# Patient Record
Sex: FEMALE | Race: WHITE | Employment: OTHER | URBAN - METROPOLITAN AREA
[De-identification: names, ages, dates, MRNs, and addresses within clinical notes are randomized per-mention and may not be internally consistent; named-entity substitution may affect disease eponyms.]

---

## 2019-10-18 ENCOUNTER — HOSPITAL ENCOUNTER (OUTPATIENT)
Age: 44
Discharge: HOME | End: 2019-10-20
Payer: COMMERCIAL

## 2019-10-18 LAB
T4 FREE: 1.38 NG/DL (ref 0.93–1.7)
TSH SERPL DL<=0.05 MIU/L-ACNC: 1.49 UIU/ML (ref 0.27–4.2)

## 2019-10-18 PROCEDURE — 84439 ASSAY OF FREE THYROXINE: CPT

## 2019-10-18 PROCEDURE — 84443 ASSAY THYROID STIM HORMONE: CPT

## 2019-10-18 PROCEDURE — 36415 COLL VENOUS BLD VENIPUNCTURE: CPT

## 2020-02-17 ENCOUNTER — TELEPHONE (OUTPATIENT)
Dept: BREAST CENTER | Age: 45
End: 2020-02-17

## 2020-02-18 NOTE — PROGRESS NOTES
Subjective:  History of Left breast DCIS s/p Bilateral skin sparing mastectomy with autologous reconstruction and left SLN biopsy at [facility] 07/07/2014 per Dr. Rosemary Lockhart/Dr. Jesusita Sparrow. Patient ID: Dusty Medley is a 45 y.o. female. MIRYAM Hoffman is an extremely pleasant 45 y.o. female with a history of pre-menopausal (age 41-39) stage 0 (Tis, N0, M0) left breast ductal carcinoma in situ: ER/TN positive with H-score 190; TN positive H-score 95. She presents to establish care locally. She underwent a baseline mammogram in May 2014 which revealed calcifications in the upper outer quadrant of the left breast over a 6 x 1.5 cm area which were deemed suspicious. A left breast ultrasound noted calcifications without mass.     -05/28/2014 Stereotactic core needle biopsy of calcifications in the retroareolar breast as well as in the posterior 1:00 breast revealed ductal carcinoma in situ, solid and cribriform type, nuclear grade 2-3 with comedo necrosis and focal apocrine features. No evidence of invasive carcinoma.    -06/10/2014:  Diagnostic mammogram at [facility] noted calcifications over a 9.7 cm region extending to within 3 mm of the nipple into the upper outer quadrant. 06/18/2014 Bilateral Breast MRI @ [facility]  FINDINGS:  Examination shows no linear, nodular or spiculated areas of   enhancement within the right breast. There is benign fibroglandular   enhancement seen in the right breast    Examination shows  nodular enhancement spanning from the posterior   extent of the nipple up towards the 1:00 region, measuring 97 mm   anterior to posterior by approximately 50 mm superior to inferior by   approximately 30 mm medial to lateral. There are biopsy site changes   seen in the retroareolar region and at the 1:00 location which   essentially marked the most anterior extent of the calcifications and   the near posterior extent of the calcifications.  There are still   residual hematomas seen at both sites larger at the posterior aspect   measuring approximately 2 cm in longest dimension with the anterior   hematoma measuring 15 mm in longest dimension.  The lymph nodes are   symmetrical in their appearance bilaterally. Correlation is made with   the patients prior study.     -Status post bilateral skin sparing mastectomy with autologous reconstruction and left sentinel lymph node biopsy at [facility] in [place] on 2014 per Dr. Jackeline Roth and Dr. Alma Delia Humphrey. Breast cancer risk factors include paternal great aunt with breast cancer at age 48; 1 (of this aunts daughters) paternal great cousins with breast cancer in their 46s. Hx skin cancer in the family, unknown which sub-type. Ashkenazi Hindu Ancestry: No.    OBSTETRIC RELATED HISTORY:  Age of menarche was 8. Currently on IUD Mirena since 2009. Patient denies hormonal therapy; an exception may be short term treatment she had for acne in the past.  Patient is . Age of first live birth was 34. Patient did breast feed. CANCER SURVEILLANCE HISTORY:  Mammograms: Yes   Breast MRI's: Yes   Breast Biopsies: Yes   Colonoscopy: Yes   GI Polyps: No   EGD: No   Pelvic Exam: Yes/2019  Pap Smear: Yes/2019  Dermatology: Yes 2020; Rosacea   Lung screening: no    BMI 40.7  Bra Size: 42 DDD    Because violence is so common, we ask all our patients: are you in a relationship or do you live with a person who threatens, hurts, or controls you:  Safe. Patient drinks little caffeinated beverages. Patient does not smoke cigarettes. Patient does not use recreational drugs.       Past Medical History:   Diagnosis Date    Cancer [facility])     left breast -Bilateral mastectomy with tram flap reconstruction      Past Medical History:   Diagnosis Date    Abnormal glandular Papanicolaou smear of cervix     Anemia, unspecified     Cancer ([facility] [address] 2014    left breast facility-administered medications on file prior to visit. 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 Generally feels well. Busy with work (Chiropractic practice) and sports with her children. HENT: Negative for congestion, postnasal drip, rhinorrhea, sinus pressure, sinus pain, sore throat, trouble swallowing and voice change. Eyes: Negative for discharge, itching and visual disturbance. Respiratory: Negative for cough, choking, chest tightness, shortness of breath and wheezing. Cardiovascular: Negative for chest pain, palpitations and leg swelling. Gastrointestinal: Negative for abdominal distention, abdominal pain, blood in stool, constipation, diarrhea, nausea and vomiting. Endocrine: Negative for cold intolerance and heat intolerance. Genitourinary: Negative for difficulty urinating, dysuria, frequency and hematuria. Musculoskeletal: Negative for arthralgias, back pain, gait problem, joint swelling, myalgias, neck pain and neck stiffness. Allergic/Immunologic: Negative for environmental allergies and food allergies. Neurological: Negative for dizziness, seizures, syncope, speech difficulty, weakness, light-headedness and headaches. Hematological: Negative for adenopathy. Does not bruise/bleed easily. Psychiatric/Behavioral: Negative for agitation, confusion and decreased concentration. The patient is not nervous/anxious. Objective:   Physical Exam  Vitals signs and nursing note reviewed. Constitutional:       General: She is not in acute distress. Appearance: Normal appearance. She is well-developed. She is not diaphoretic. Comments: ECOG 0. Pleasant and cooperative. HENT:      Head: Normocephalic and atraumatic. Mouth/Throat:      Pharynx: No oropharyngeal exudate.    Eyes:      General: No scleral icterus. Right eye: No discharge. Left eye: No discharge. Conjunctiva/sclera: Conjunctivae normal.   Neck:      Musculoskeletal: Normal range of motion and neck supple. Thyroid: No thyromegaly. Vascular: No JVD. Trachea: No tracheal deviation. Cardiovascular:      Rate and Rhythm: Normal rate and regular rhythm. Heart sounds: No murmur. No friction rub. No gallop. Pulmonary:      Effort: Pulmonary effort is normal. No respiratory distress or retractions. Breath sounds: Normal breath sounds. No stridor. No wheezing or rales. Chest:      Chest wall: No mass, lacerations, deformity, swelling, tenderness or edema. Breasts: Breasts are symmetrical.         Right: No inverted nipple, mass, nipple discharge, skin change or tenderness. Left: No inverted nipple, mass, nipple discharge, skin change or tenderness. Comments: Bilateral reconstructed breasts. Good symmetry and without clinically suspicious findings. No axillary adenopathy. Bilateral tattooing of NAC (reports tattooing was in [place]). No edema of the bilateral UE. Abdominal:      General: There is no distension. Palpations: Abdomen is soft. Tenderness: There is no abdominal tenderness. There is no guarding or rebound. Musculoskeletal: Normal range of motion. General: No tenderness or deformity. Right shoulder: Normal.      Left shoulder: Normal.   Lymphadenopathy:      Cervical: No cervical adenopathy. Right cervical: No superficial, deep or posterior cervical adenopathy. Left cervical: No superficial, deep or posterior cervical adenopathy. Upper Body:      Right upper body: No pectoral adenopathy. Left upper body: No pectoral adenopathy. Skin:     General: Skin is warm and dry. Coloration: Skin is not pale. Findings: No erythema or rash.    Neurological:      Mental Status: She is alert and oriented to person, place, Kelly Hampton, APRN - CNP

## 2020-02-20 ENCOUNTER — TELEPHONE (OUTPATIENT)
Dept: BREAST CENTER | Age: 45
End: 2020-02-20

## 2020-02-20 ENCOUNTER — HOSPITAL ENCOUNTER (OUTPATIENT)
Age: 45
Discharge: HOME | End: 2020-02-22
Payer: COMMERCIAL

## 2020-02-20 ENCOUNTER — OFFICE VISIT (OUTPATIENT)
Dept: BREAST CENTER | Age: 45
End: 2020-02-20
Payer: COMMERCIAL

## 2020-02-20 VITALS
HEIGHT: 65 IN | DIASTOLIC BLOOD PRESSURE: 60 MMHG | OXYGEN SATURATION: 98 % | TEMPERATURE: 98.2 F | HEART RATE: 70 BPM | WEIGHT: 244 LBS | RESPIRATION RATE: 18 BRPM | SYSTOLIC BLOOD PRESSURE: 102 MMHG | BODY MASS INDEX: 40.65 KG/M2

## 2020-02-20 LAB
ALBUMIN SERPL-MCNC: 4.3 G/DL (ref 3.5–5.2)
ALP BLD-CCNC: 78 U/L (ref 35–104)
ALT SERPL-CCNC: 23 U/L (ref 0–32)
ANION GAP SERPL CALCULATED.3IONS-SCNC: 14 MMOL/L (ref 7–16)
AST SERPL-CCNC: 17 U/L (ref 0–31)
BASOPHILS ABSOLUTE: 0.08 E9/L (ref 0–0.2)
BASOPHILS RELATIVE PERCENT: 1 % (ref 0–2)
BILIRUB SERPL-MCNC: 0.2 MG/DL (ref 0–1.2)
BUN BLDV-MCNC: 16 MG/DL (ref 6–20)
CALCIUM SERPL-MCNC: 9.6 MG/DL (ref 8.6–10.2)
CHLORIDE BLD-SCNC: 103 MMOL/L (ref 98–107)
CO2: 21 MMOL/L (ref 22–29)
CREAT SERPL-MCNC: 0.8 MG/DL (ref 0.5–1)
EOSINOPHILS ABSOLUTE: 0.16 E9/L (ref 0.05–0.5)
EOSINOPHILS RELATIVE PERCENT: 1.9 % (ref 0–6)
GFR AFRICAN AMERICAN: >60
GFR NON-AFRICAN AMERICAN: >60 ML/MIN/1.73
GLUCOSE BLD-MCNC: 108 MG/DL (ref 74–99)
HCT VFR BLD CALC: 45 % (ref 34–48)
HEMOGLOBIN: 14.6 G/DL (ref 11.5–15.5)
IMMATURE GRANULOCYTES #: 0.02 E9/L
IMMATURE GRANULOCYTES %: 0.2 % (ref 0–5)
LYMPHOCYTES ABSOLUTE: 2.17 E9/L (ref 1.5–4)
LYMPHOCYTES RELATIVE PERCENT: 26.1 % (ref 20–42)
MCH RBC QN AUTO: 29 PG (ref 26–35)
MCHC RBC AUTO-ENTMCNC: 32.4 % (ref 32–34.5)
MCV RBC AUTO: 89.3 FL (ref 80–99.9)
MONOCYTES ABSOLUTE: 0.52 E9/L (ref 0.1–0.95)
MONOCYTES RELATIVE PERCENT: 6.3 % (ref 2–12)
NEUTROPHILS ABSOLUTE: 5.37 E9/L (ref 1.8–7.3)
NEUTROPHILS RELATIVE PERCENT: 64.5 % (ref 43–80)
PDW BLD-RTO: 13.2 FL (ref 11.5–15)
PLATELET # BLD: 262 E9/L (ref 130–450)
PMV BLD AUTO: 10.6 FL (ref 7–12)
POTASSIUM SERPL-SCNC: 4.3 MMOL/L (ref 3.5–5)
RBC # BLD: 5.04 E12/L (ref 3.5–5.5)
SODIUM BLD-SCNC: 138 MMOL/L (ref 132–146)
T4 FREE: 1.07 NG/DL (ref 0.93–1.7)
TOTAL PROTEIN: 7.3 G/DL (ref 6.4–8.3)
TSH SERPL DL<=0.05 MIU/L-ACNC: 11.15 UIU/ML (ref 0.27–4.2)
WBC # BLD: 8.3 E9/L (ref 4.5–11.5)

## 2020-02-20 PROCEDURE — 84443 ASSAY THYROID STIM HORMONE: CPT

## 2020-02-20 PROCEDURE — 99204 OFFICE O/P NEW MOD 45 MIN: CPT | Performed by: NURSE PRACTITIONER

## 2020-02-20 PROCEDURE — 80053 COMPREHEN METABOLIC PANEL: CPT

## 2020-02-20 PROCEDURE — 99203 OFFICE O/P NEW LOW 30 MIN: CPT | Performed by: NURSE PRACTITIONER

## 2020-02-20 PROCEDURE — 84439 ASSAY OF FREE THYROXINE: CPT

## 2020-02-20 PROCEDURE — 36415 COLL VENOUS BLD VENIPUNCTURE: CPT

## 2020-02-20 PROCEDURE — 85025 COMPLETE CBC W/AUTO DIFF WBC: CPT

## 2020-02-20 PROCEDURE — 36415 COLL VENOUS BLD VENIPUNCTURE: CPT | Performed by: NURSE PRACTITIONER

## 2020-02-20 RX ORDER — LEVOTHYROXINE SODIUM 0.15 MG/1
150 TABLET ORAL DAILY
COMMUNITY
End: 2022-06-07

## 2020-02-20 SDOH — HEALTH STABILITY: MENTAL HEALTH: HOW OFTEN DO YOU HAVE A DRINK CONTAINING ALCOHOL?: 2-4 TIMES A MONTH

## 2020-02-20 ASSESSMENT — ENCOUNTER SYMPTOMS
SHORTNESS OF BREATH: 0
CONSTIPATION: 0
EYE DISCHARGE: 0
VOMITING: 0
COUGH: 0
BLOOD IN STOOL: 0
DIARRHEA: 0
WHEEZING: 0
SINUS PAIN: 0
RHINORRHEA: 0
TROUBLE SWALLOWING: 0
NAUSEA: 0
SORE THROAT: 0
ABDOMINAL PAIN: 0
VOICE CHANGE: 0
BACK PAIN: 0
CHEST TIGHTNESS: 0
SINUS PRESSURE: 0
EYE ITCHING: 0
CHOKING: 0
ABDOMINAL DISTENTION: 0

## 2020-02-20 NOTE — COMMUNICATION BODY
Assessment:  45 y.o. extremely pleasant female with history of screening detected stage 0 (Tis, N0, M0,) carcinoma in situ extensively involving the left breast, (measuring over a 9.7 cm region) diagnosed in 2016 at age 42. She has no first degree relatives with breast or gynecologic malignancies. She presents to [facility] care locally.    -05/28/2014 Stereotactic core needle biopsy of calcifications in the retroareolar breast as well as in the posterior 1:00 breast revealed ductal carcinoma in situ, solid and cribriform type, nuclear grade 2-3 with comedo necrosis and focal apocrine features. No evidence of invasive carcinoma.    -06/10/2014:  Diagnostic mammogram at [facility] noted calcifications over a 9.7 cm region extending to within 3 mm of the nipple into the upper outer quadrant.     -06/18/2014 Bilateral Breast MRI @ [address]:  Examination shows no linear, nodular or spiculated areas of   enhancement within the right breast. There is benign fibroglandular   enhancement seen in the right breast    Examination shows  nodular enhancement spanning from the posterior   extent of the nipple up towards the 1:00 region, measuring 97 mm   anterior to posterior by approximately 50 mm superior to inferior by   approximately 30 mm medial to lateral. There are biopsy site changes   seen in the retroareolar region and at the 1:00 location which   essentially marked the most anterior extent of the calcifications and   the near posterior extent of the calcifications. There are still   residual hematomas seen at both sites larger at the posterior aspect   measuring approximately 2 cm in longest dimension with the anterior   hematoma measuring 15 mm in longest dimension.  The lymph nodes are   symmetrical in their appearance bilaterally.  Correlation is made with   the patients prior study.     -Status post bilateral skin sparing mastectomy with autologous reconstruction and left sentinel lymph node biopsy at [facility] in [place] on 7/7/2014 per Dr. Elliott Najera and Dr. Thresea Spurling. Clinically, she is doing well and is without evidence of malignancy. We discussed that mastectomy achieves excellent long-term survival with a local recurrence rate on the order of 1 percent. For women who have undergone bilateral mastectomy, there is no role for adjuvant endocrine therapy. We reviewed NCCN Guidelines, Version 1.2020 for follow up. While there is no indication for routine laboratory testing, a CBC/CMP is reasonable on this visit since she has not had routine lab work in some time. Will also check thyroid studies in view of her total thyroidectomy in 03/2017.    -06/11/2014 BRCA 1/2 screening:  Negative for mutations. Plan:  Continue monthly breast/chest wall self examination; detailed instructions reviewed today. Bring any changes to your physician's attention. Continue healthy diet and exercise routinely as tolerated. Avoid alcohol or limit to < 3 drinks per week. Limit caffeine intake. No indication for Imaging. Continue follow up with Primary Care/Gynecology. While annual follow up is appropriate, she prefers close observation so will see her in back 6 months.

## 2020-02-20 NOTE — TELEPHONE ENCOUNTER
Patient returned call. Patient notified of lab work from today, including her thyroid levels. Faxed to Dr. Garret Justice at [phone]. Patient stated she will call his office to follow up.

## 2020-02-20 NOTE — LETTER
[facility]  [address] [address]  Phone: [phone]  Fax: [phone]    Inga Vicente MD    February 20, 2020       Patient: Viktor Ruelas   MR Number: <[id number]>   YOB: 1975   Date of Visit: 2/20/2020       Dear Jaswinder Sheets: Thank you for the request for consultation for Amanda Erwin to the office of Dr. Julisa Woods and Nurse Practitioner Hadley Grullon for her remote history of Left breast extensive DCIS. Below are the relevant portions of my assessment and plan of care. Assessment:  45 y.o. extremely pleasant female with history of screening detected stage 0 (Tis, N0, M0,) carcinoma in situ extensively involving the left breast (measuring over a 9.7 cm region) diagnosed in 2016 at age 42. She has no first degree relatives with breast or gynecologic malignancies. She presents to establish care locally.    -05/28/2014 Stereotactic core needle biopsy of calcifications in the retroareolar breast as well as in the posterior 1:00 breast revealed ductal carcinoma in situ, solid and cribriform type, nuclear grade 2-3 with comedo necrosis and focal apocrine features.  No evidence of invasive carcinoma.    -06/10/2014:  Diagnostic mammogram at [facility] noted calcifications over a 9.7 cm region extending to within 3 mm of the nipple into the upper outer quadrant.     -06/18/2014 Bilateral Breast MRI @ [address]:  Examination shows no linear, nodular or spiculated areas of   enhancement within the right breast. There is benign fibroglandular   enhancement seen in the right breast    Examination shows  nodular enhancement spanning from the posterior   extent of the nipple up towards the 1:00 region, measuring 97 mm   anterior to posterior by approximately 50 mm superior to inferior by   approximately 30 mm medial to lateral. There are biopsy site changes seen in the retroareolar region and at the 1:00 location which   essentially marked the most anterior extent of the calcifications and   the near posterior extent of the calcifications. There are still   residual hematomas seen at both sites larger at the posterior aspect   measuring approximately 2 cm in longest dimension with the anterior   hematoma measuring 15 mm in longest dimension.  The lymph nodes are   symmetrical in their appearance bilaterally. Correlation is made with   the patients prior study.     -Status post bilateral skin sparing mastectomy with autologous reconstruction and left sentinel lymph node biopsy at [facility] in [place] on 7/7/2014 per Dr. Vikas Garcia and Dr. Santana Guerra. Clinically, she is doing well and is without evidence of malignancy. We discussed that mastectomy achieves excellent long-term survival with a local recurrence rate on the order of 1 percent. For women who have undergone bilateral mastectomy, there is no role for adjuvant endocrine therapy. We reviewed NCCN Guidelines, Version 1.2020 for follow up. While there is no indication for routine laboratory testing, a CBC/CMP is reasonable on this visit since she has not had routine lab work in some time. Will also check thyroid studies in view of her total thyroidectomy in 03/2017.    -06/11/2014 BRCA 1/2 screening:  Negative for mutations. Plan:  Continue monthly breast/chest wall self examination; detailed instructions reviewed today. Bring any changes to your physician's attention. Continue healthy diet and exercise routinely as tolerated. Avoid alcohol or limit to < 3 drinks per week. Limit caffeine intake. No indication for Imaging. Continue follow up with Primary Care/Gynecology. While annual follow up is appropriate, she prefers close observation so will see her in back 6 months. If you have questions, please do not hesitate to call me. I look forward to following Ethan Livingston along with you.     Sincerely,        Melissa Miranda, APRN - CNP    CC providers:  Lexie Santacruz MD  [address] [facility] [address]  [address] [place] [employer]

## 2020-08-23 ENCOUNTER — HOSPITAL ENCOUNTER (EMERGENCY)
Age: 45
Discharge: HOME | End: 2020-08-23
Attending: EMERGENCY MEDICINE
Payer: COMMERCIAL

## 2020-08-23 ENCOUNTER — APPOINTMENT (OUTPATIENT)
Dept: GENERAL RADIOLOGY | Age: 45
End: 2020-08-23
Payer: COMMERCIAL

## 2020-08-23 VITALS
RESPIRATION RATE: 16 BRPM | HEART RATE: 84 BPM | SYSTOLIC BLOOD PRESSURE: 111 MMHG | BODY MASS INDEX: 40.65 KG/M2 | OXYGEN SATURATION: 95 % | WEIGHT: 244 LBS | DIASTOLIC BLOOD PRESSURE: 62 MMHG | HEIGHT: 65 IN | TEMPERATURE: 99.2 F

## 2020-08-23 LAB
ALBUMIN SERPL-MCNC: 3.7 G/DL (ref 3.5–5.2)
ALP BLD-CCNC: 49 U/L (ref 35–104)
ALT SERPL-CCNC: 16 U/L (ref 0–32)
ANION GAP SERPL CALCULATED.3IONS-SCNC: 13 MMOL/L (ref 7–16)
AST SERPL-CCNC: 21 U/L (ref 0–31)
BASOPHILS ABSOLUTE: 0.01 E9/L (ref 0–0.2)
BASOPHILS RELATIVE PERCENT: 0.2 % (ref 0–2)
BILIRUB SERPL-MCNC: 0.5 MG/DL (ref 0–1.2)
BUN BLDV-MCNC: 12 MG/DL (ref 6–20)
CALCIUM SERPL-MCNC: 8.2 MG/DL (ref 8.6–10.2)
CHLORIDE BLD-SCNC: 100 MMOL/L (ref 98–107)
CO2: 19 MMOL/L (ref 22–29)
CREAT SERPL-MCNC: 0.8 MG/DL (ref 0.5–1)
EOSINOPHILS ABSOLUTE: 0 E9/L (ref 0.05–0.5)
EOSINOPHILS RELATIVE PERCENT: 0 % (ref 0–6)
GFR AFRICAN AMERICAN: >60
GFR NON-AFRICAN AMERICAN: >60 ML/MIN/1.73
GLUCOSE BLD-MCNC: 114 MG/DL (ref 74–99)
HCT VFR BLD CALC: 41.5 % (ref 34–48)
HEMOGLOBIN: 14.2 G/DL (ref 11.5–15.5)
IMMATURE GRANULOCYTES #: 0.03 E9/L
IMMATURE GRANULOCYTES %: 0.5 % (ref 0–5)
LYMPHOCYTES ABSOLUTE: 0.75 E9/L (ref 1.5–4)
LYMPHOCYTES RELATIVE PERCENT: 12.2 % (ref 20–42)
MCH RBC QN AUTO: 29.3 PG (ref 26–35)
MCHC RBC AUTO-ENTMCNC: 34.2 % (ref 32–34.5)
MCV RBC AUTO: 85.7 FL (ref 80–99.9)
MONOCYTES ABSOLUTE: 0.79 E9/L (ref 0.1–0.95)
MONOCYTES RELATIVE PERCENT: 12.8 % (ref 2–12)
NEUTROPHILS ABSOLUTE: 4.58 E9/L (ref 1.8–7.3)
NEUTROPHILS RELATIVE PERCENT: 74.3 % (ref 43–80)
PDW BLD-RTO: 13.2 FL (ref 11.5–15)
PLATELET # BLD: 165 E9/L (ref 130–450)
PMV BLD AUTO: 9.9 FL (ref 7–12)
POTASSIUM REFLEX MAGNESIUM: 3.9 MMOL/L (ref 3.5–5)
RBC # BLD: 4.84 E12/L (ref 3.5–5.5)
SODIUM BLD-SCNC: 132 MMOL/L (ref 132–146)
TOTAL PROTEIN: 7.2 G/DL (ref 6.4–8.3)
WBC # BLD: 6.2 E9/L (ref 4.5–11.5)

## 2020-08-23 PROCEDURE — 71045 X-RAY EXAM CHEST 1 VIEW: CPT

## 2020-08-23 PROCEDURE — 96374 THER/PROPH/DIAG INJ IV PUSH: CPT

## 2020-08-23 PROCEDURE — 80053 COMPREHEN METABOLIC PANEL: CPT

## 2020-08-23 PROCEDURE — 99283 EMERGENCY DEPT VISIT LOW MDM: CPT

## 2020-08-23 PROCEDURE — 85025 COMPLETE CBC W/AUTO DIFF WBC: CPT

## 2020-08-23 PROCEDURE — 99284 EMERGENCY DEPT VISIT MOD MDM: CPT

## 2020-08-23 PROCEDURE — 96361 HYDRATE IV INFUSION ADD-ON: CPT

## 2020-08-23 PROCEDURE — 6360000002 HC RX W HCPCS: Performed by: EMERGENCY MEDICINE

## 2020-08-23 PROCEDURE — 2580000003 HC RX 258: Performed by: EMERGENCY MEDICINE

## 2020-08-23 RX ORDER — KETOROLAC TROMETHAMINE 30 MG/ML
30 INJECTION, SOLUTION INTRAMUSCULAR; INTRAVENOUS ONCE
Status: COMPLETED | OUTPATIENT
Start: 2020-08-23 | End: 2020-08-23

## 2020-08-23 RX ORDER — 0.9 % SODIUM CHLORIDE 0.9 %
1000 INTRAVENOUS SOLUTION INTRAVENOUS ONCE
Status: COMPLETED | OUTPATIENT
Start: 2020-08-23 | End: 2020-08-23

## 2020-08-23 RX ORDER — ONDANSETRON 4 MG/1
4 TABLET, ORALLY DISINTEGRATING ORAL EVERY 8 HOURS PRN
Qty: 24 TABLET | Refills: 0 | Status: SHIPPED | OUTPATIENT
Start: 2020-08-23 | End: 2021-03-26

## 2020-08-23 RX ADMIN — SODIUM CHLORIDE 1000 ML: 9 INJECTION, SOLUTION INTRAVENOUS at 14:36

## 2020-08-23 RX ADMIN — SODIUM CHLORIDE 1000 ML: 9 INJECTION, SOLUTION INTRAVENOUS at 13:03

## 2020-08-23 RX ADMIN — KETOROLAC TROMETHAMINE 30 MG: 30 INJECTION, SOLUTION INTRAMUSCULAR at 13:03

## 2020-08-23 ASSESSMENT — ENCOUNTER SYMPTOMS
NAUSEA: 0
EYE DISCHARGE: 0
SINUS PRESSURE: 0
ABDOMINAL DISTENTION: 0
EYE PAIN: 0
DIARRHEA: 0
COUGH: 1
EYE REDNESS: 0
BACK PAIN: 0
SHORTNESS OF BREATH: 1
VOMITING: 0
WHEEZING: 0
ABDOMINAL PAIN: 1
SORE THROAT: 0

## 2020-08-23 ASSESSMENT — PAIN DESCRIPTION - DESCRIPTORS: DESCRIPTORS: ACHING

## 2020-08-23 ASSESSMENT — PAIN SCALES - GENERAL
PAINLEVEL_OUTOF10: 5
PAINLEVEL_OUTOF10: 5

## 2020-08-23 ASSESSMENT — PAIN DESCRIPTION - PAIN TYPE: TYPE: ACUTE PAIN

## 2020-08-23 ASSESSMENT — PAIN DESCRIPTION - LOCATION: LOCATION: GENERALIZED

## 2020-08-23 NOTE — ED PROVIDER NOTES
Department of Emergency Medicine   ED  Provider Note  Admit Date/RoomTime: 8/23/2020 12:12 PM  ED Room: 04/04 8/23/20  12:49 PM EDT    History of Present Illness:   Omar Mann is a 45 y.o. female presenting to the ED for poor oral intake, beginning 1 week. The complaint has been persistent, moderate in severity, and worsened by nothing. Patient with past 1 week she has had fever, myalgias, cough, shortness of breath, lack of taste and poor appetite. He reports he is been trying to force himself to drink a glass of water a day however she is unable to as it \"dissolves on her mouth but does not go down\". She denies any nausea or vomiting. He was tested for COVID-19 outpatient a couple days ago and was tested positive. She reports some mild epigastric discomfort as well. She has not taken anything for her symptoms. Review of Systems:   Pertinent positives and negatives are stated within HPI, all other systems reviewed and are negative. Review of Systems   Constitutional: Positive for activity change, appetite change, chills, fatigue and fever. HENT: Negative for ear pain, sinus pressure and sore throat. Eyes: Negative for pain, discharge and redness. Respiratory: Positive for cough and shortness of breath. Negative for wheezing. Cardiovascular: Negative for chest pain. Gastrointestinal: Positive for abdominal pain. Negative for abdominal distention, diarrhea, nausea and vomiting. Genitourinary: Negative for dysuria and frequency. Musculoskeletal: Positive for myalgias. Negative for arthralgias and back pain. Skin: Negative for rash and wound. Neurological: Negative for weakness and headaches. Hematological: Negative for adenopathy.    All other systems reviewed and are negative.           --------------------------------------------- PAST HISTORY ---------------------------------------------  Past Medical History:  has a past medical history of Abnormal glandular rebound. Skin:     General: Skin is warm and dry. Neurological:      Mental Status: She is alert and oriented to person, place, and time. Cranial Nerves: No cranial nerve deficit.       Coordination: Coordination normal.            -------------------------------------------------- RESULTS -------------------------------------------------  All laboratory and radiology results have been personally reviewed by myself   LABS:  Results for orders placed or performed during the hospital encounter of 08/23/20   CBC Auto Differential   Result Value Ref Range    WBC 6.2 4.5 - 11.5 E9/L    RBC 4.84 3.50 - 5.50 E12/L    Hemoglobin 14.2 11.5 - 15.5 g/dL    Hematocrit 41.5 34.0 - 48.0 %    MCV 85.7 80.0 - 99.9 fL    MCH 29.3 26.0 - 35.0 pg    MCHC 34.2 32.0 - 34.5 %    RDW 13.2 11.5 - 15.0 fL    Platelets 954 945 - 039 E9/L    MPV 9.9 7.0 - 12.0 fL    Neutrophils % 74.3 43.0 - 80.0 %    Immature Granulocytes % 0.5 0.0 - 5.0 %    Lymphocytes % 12.2 (L) 20.0 - 42.0 %    Monocytes % 12.8 (H) 2.0 - 12.0 %    Eosinophils % 0.0 0.0 - 6.0 %    Basophils % 0.2 0.0 - 2.0 %    Neutrophils Absolute 4.58 1.80 - 7.30 E9/L    Immature Granulocytes # 0.03 E9/L    Lymphocytes Absolute 0.75 (L) 1.50 - 4.00 E9/L    Monocytes Absolute 0.79 0.10 - 0.95 E9/L    Eosinophils Absolute 0.00 (L) 0.05 - 0.50 E9/L    Basophils Absolute 0.01 0.00 - 0.20 E9/L   Comprehensive Metabolic Panel w/ Reflex to MG   Result Value Ref Range    Sodium 132 132 - 146 mmol/L    Potassium reflex Magnesium 3.9 3.5 - 5.0 mmol/L    Chloride 100 98 - 107 mmol/L    CO2 19 (L) 22 - 29 mmol/L    Anion Gap 13 7 - 16 mmol/L    Glucose 114 (H) 74 - 99 mg/dL    BUN 12 6 - 20 mg/dL    CREATININE 0.8 0.5 - 1.0 mg/dL    GFR Non-African American >60 >=60 mL/min/1.73    GFR African American >60     Calcium 8.2 (L) 8.6 - 10.2 mg/dL    Total Protein 7.2 6.4 - 8.3 g/dL    Alb 3.7 3.5 - 5.2 g/dL    Total Bilirubin 0.5 0.0 - 1.2 mg/dL    Alkaline Phosphatase 49 35 - 104 U/L    ALT 16 0 - 32 U/L    AST 21 0 - 31 U/L       RADIOLOGY:  Interpreted by Radiologist.  XR CHEST PORTABLE   Final Result      NO ACUTE CARDIOPULMONARY PROCESS                 ------------------------------ ED COURSE/MEDICAL DECISION MAKING----------------------  Medications   0.9 % sodium chloride bolus (0 mLs Intravenous Stopped 8/23/20 1400)   ketorolac (TORADOL) injection 30 mg (30 mg Intravenous Given 8/23/20 1303)   0.9 % sodium chloride bolus (0 mLs Intravenous Stopped 8/23/20 1546)       ED Course as of Aug 23 2002   Sun Aug 23, 2020   1445 Abdominal pain resolved. Abdomen remains soft, non-tender. [MF]      ED Course User Index  [MF] Brenna Sullivan DO          Medical Decision Making:    Patient presents with known [phone] for poor oral intake due to lack of appetite. Patient given IV fluid rehydration. Patient is stable with normal vital signs we discharged home with strict return precautions instructions to improve oral intake despite lack of appetite    Counseling: The emergency provider has spoken with the patient and discussed todays results, in addition to providing specific details for the plan of care and counseling regarding the diagnosis and prognosis. Questions are answered at this time and they are agreeable with the plan.      --------------------------------- IMPRESSION AND DISPOSITION ---------------------------------    IMPRESSION  1. COVID-19    2. Epigastric pain        DISPOSITION  Disposition: Discharge to home  Patient condition is stable      NOTE: This report was transcribed using voice recognition software.  Effort was made to ensure accuracy; however, inadvertent computerized transcription errors may be present         Brenna Sullivan DO  08/23/20 2003

## 2020-08-24 ENCOUNTER — CARE COORDINATION (OUTPATIENT)
Dept: CASE MANAGEMENT | Age: 45
End: 2020-08-24

## 2020-08-24 NOTE — CARE COORDINATION
Patient states she was tested positive with covid at an outside facility on Tues of last week. Complains of fevers between 100 -103. Has called PCP for f/u, pcp is out of town. Went over hydration with power aide zero and or pedialyte. Patient verbalized understanding. Patient also agreed to [facility]. Patient contacted regarding recent visit for viral symptoms. This [place] contacted the patient by telephone to perform post discharge call. Verified name and  with patient as identifiers. Provided introduction to self, and reason for call due to viral symptoms of infection and/or exposure to COVID-19. Patient presented to emergency department/flu clinic with complaints of viral symptoms/exposure to COVID. Patient reports symptoms are the same. Due to no new or worsening symptoms the RN CTN/ACBRITTNY was not notified for escalation. Discussed exposure protocols and quarantine with CDC Guidelines What To Do If You Are Sick    Patient was given an opportunity for questions and concerns. Stay home except to get medical care    Separate yourself from other people and animals in your home    Call ahead before visiting your doctor    Wear a facemask    Cover your coughs and sneezes    Clean your hands often    Avoid sharing personal household items    Clean all high-touch surfaces everyday    Monitor your symptoms  Seek prompt medical attention if your illness is worsening (e.g., difficulty breathing). Before seeking care, call your healthcare provider and tell them that you have, or are being evaluated for, COVID-19. Put on a facemask before you enter the facility. These steps will help the healthcare provider's office to keep other people in the office or waiting room from getting infected or exposed. Ask your healthcare provider to call the local or state health department.  Persons who are placed under If you have a medical emergency and need to call 911, notify the dispatch personnel that you have, or are being evaluated for COVID-19. If possible, put on a facemask before emergency medical services arrive. The patient agrees to contact the [facility] exposure line [phone], local [facility] department [place] Department of Health: ([phone]) and PCP office for questions related to their healthcare. Author provided contact information for future reference. Patient/family/caregiver given information for [employer] and agrees to enroll {Blank Single Select Template:20061::\"yes\"  Patient's preferred e-mail: [email or web address]  Patient's preferred phone number: [phone]  Based on Loop alert triggers, patient will be contacted by nurse care manager for worsening symptoms.     Yamile Andrade, [address]  [facility] Coordination Transition

## 2020-08-26 ENCOUNTER — CARE COORDINATION (OUTPATIENT)
Dept: CARE COORDINATION | Age: 45
End: 2020-08-26

## 2020-08-26 NOTE — CARE COORDINATION
Yellow alert noted in Loop remote symptom monitoring program. Messaged patient to notify Monae Epstein if symptoms have worsened since yesterday.

## 2020-08-26 NOTE — CARE COORDINATION
Joseph John MSN, RN, RN, 10:58 AM  I'm sorry to hear that. Here is some information: Diarrhea usually clears up quickly without treatment. To help you cope with your signs and symptoms until the diarrhea goes away, try to: *Drink plenty of clear liquids, including water, broths and juices. Avoid caffeine and alcohol. *Add semisolid and low-fiber foods gradually as your bowel movements return to normal. Try soda crackers, toast, eggs, rice or chicken. Avoid certain foods such as dairy products, fatty foods, high-fiber foods or highly seasoned foods for a few days. *Ask your physician about anti-diarrheal medications. Over-the-counter (OTC) anti-diarrheal medications, such as loperamide and bismuth subsalicylate, might help reduce the number of watery bowel movements and control severe symptoms. In reply to  [place] fever is down, but I've begun to have issues with diarrh\"view more     Valencia Villanueva, 10:34 AM  My fever is down, but I've begun to have issues with diarrhea. Anything I could try ? Poor  has washed so many sheets and towels its embarrassing!

## 2020-09-16 ASSESSMENT — ENCOUNTER SYMPTOMS
CHEST TIGHTNESS: 0
COUGH: 0
WHEEZING: 0
ABDOMINAL DISTENTION: 0
VOMITING: 0
VOICE CHANGE: 0
CONSTIPATION: 0
DIARRHEA: 0
SINUS PRESSURE: 0
BLOOD IN STOOL: 0
SHORTNESS OF BREATH: 0
CHOKING: 0
EYE DISCHARGE: 0
SORE THROAT: 0
ABDOMINAL PAIN: 0
SINUS PAIN: 0
BACK PAIN: 0
TROUBLE SWALLOWING: 0
RHINORRHEA: 0
EYE ITCHING: 0
NAUSEA: 0

## 2020-09-16 NOTE — PROGRESS NOTES
Subjective:  History of Left breast DCIS s/p Bilateral skin sparing mastectomy with autologous reconstruction and left SLN biopsy at [facility] 07/07/2014 per Dr. Dee Dee Lockhart/Dr. Jerome Saul. This note was copied forward from the last encounter. Essential components for this patient record were reviewed and verified on this visit including:  recent hospitalizations, recent imaging, PMH, PSH, FH, SOC HX, Allergies, and Medications were reviewed and updated as appropriate. In addition, the assessment and plan were copied from prior office note and updated accordingly. Patient ID: Omar Mann is a 45 y.o. female. She presents today for 6 month follow up. MIRYAM Ford is an extremely pleasant 45 y.o. female with a history of pre-menopausal (age 41-39) stage 0 (Tis, N0, M0) left breast ductal carcinoma in situ: ER/MS positive with H-score 190; MS positive H-score 95. She reports some recent inflammatory changes in both axillae. She underwent a baseline mammogram in May 2014 which revealed calcifications in the upper outer quadrant of the left breast over a 6 x 1.5 cm area which were deemed suspicious. A left breast ultrasound noted calcifications without mass.     -05/28/2014 Stereotactic core needle biopsy of calcifications in the retroareolar breast as well as in the posterior 1:00 breast revealed ductal carcinoma in situ, solid and cribriform type, nuclear grade 2-3 with comedo necrosis and focal apocrine features. No evidence of invasive carcinoma.    -06/10/2014:  Diagnostic mammogram at [facility] noted calcifications over a 9.7 cm region extending to within 3 mm of the nipple into the upper outer quadrant.      06/18/2014 Bilateral Breast MRI @ [facility]  FINDINGS:  Examination shows no linear, nodular or spiculated areas of   enhancement within the right breast. There is benign fibroglandular   enhancement seen in the right breast    Examination shows hurts, or controls you:  Safe. Patient drinks little caffeinated beverages. Patient does not smoke cigarettes. Patient does not use recreational drugs. Past Medical History:   Diagnosis Date    Abnormal glandular Papanicolaou smear of cervix     Anemia, unspecified     Cancer ([facility]     left breast -Bilateral mastectomy with tram flap reconstruction    Disease of thyroid gland     multinodular; benign biopsy    Malignant neoplasm of breast (female) ([facility]     DCIS Left breast       Past Medical History:   Diagnosis Date    Abnormal glandular Papanicolaou smear of cervix     Anemia, unspecified     Cancer ([facility] [address] [facility]     left breast -Bilateral mastectomy with tram flap reconstruction    Disease of thyroid gland     multinodular; benign biopsy    Malignant neoplasm of breast (female) ([facility] [address] [facility]     DCIS Left breast      Past Surgical History:   Procedure Laterality Date    BREAST RECONSTRUCTION Bilateral     Secondary to extensive Left breast DCIS     SECTION      TEMPOROMANDIBULAR JOINT SURGERY      THYROIDECTOMY  2018    multi nodular thyroid. She was having trouble swallowing.      Social History     Socioeconomic History    Marital status:      Spouse name: Not on file    Number of children: Not on file    Years of education: Not on file    Highest education level: Not on file   Occupational History    Occupation: Chiropractor     Employer: SELF   Social Needs    Financial resource strain: Not on file    Food insecurity     Worry: Not on file     Inability: Not on file    Transportation needs     Medical: Not on file     Non-medical: Not on file   Tobacco Use    Smoking status: Never Smoker    Smokeless tobacco: Never Used   Substance and Sexual Activity    Alcohol use: Yes     Frequency: 2-4 times a month     Comment: occasional    Drug use: Never    Sexual activity: Not on file   Lifestyle    Physical activity     Days per week: Not on file Minutes per session: Not on file    Stress: Not on file   Relationships    Social connections     Talks on phone: Not on file     Gets together: Not on file     Attends Yarsani service: Not on file     Active member of club or organization: Not on file     Attends meetings of clubs or organizations: Not on file     Relationship status: Not on file    Intimate partner violence     Fear of current or ex partner: Not on file     Emotionally abused: Not on file     Physically abused: Not on file     Forced sexual activity: Not on file   Other Topics Concern    Not on file   Social History Narrative    Not on file     No Known Allergies  Current Outpatient Medications on File Prior to Visit   Medication Sig Dispense Refill    ondansetron (ZOFRAN ODT) 4 MG disintegrating tablet Take 1 tablet by mouth every 8 hours as needed for Nausea or Vomiting 24 tablet 0    levothyroxine (SYNTHROID) 150 MCG tablet Take 150 mcg by mouth Daily       No current facility-administered medications on file prior to visit. 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 Review of Systems   Constitutional: Negative for activity change, appetite change, chills, fatigue, fever and unexpected weight change. Generally feels well. Busy with work (Chiropractic practice) and sports with her children. HENT: Negative for congestion, postnasal drip, rhinorrhea, sinus pressure, sinus pain, sore throat, trouble swallowing and voice change. Eyes: Negative for discharge, itching and visual disturbance. Respiratory: Negative for cough, choking, chest tightness, shortness of breath and wheezing. Cardiovascular: Negative for chest pain, palpitations and leg swelling. Gastrointestinal: Negative for abdominal distention, abdominal pain, blood in stool, constipation, diarrhea, nausea and vomiting. Endocrine: Negative for cold intolerance and heat intolerance.    Genitourinary: Negative for difficulty urinating, dysuria, frequency and hematuria. Musculoskeletal: Negative for arthralgias, back pain, gait problem, joint swelling, myalgias, neck pain and neck stiffness. Allergic/Immunologic: Negative for environmental allergies and food allergies. Neurological: Negative for dizziness, seizures, syncope, speech difficulty, weakness, light-headedness and headaches. Hematological: Negative for adenopathy. Does not bruise/bleed easily. Psychiatric/Behavioral: Negative for agitation, confusion and decreased concentration. The patient is not nervous/anxious. Objective:   Physical Exam  Vitals signs and nursing note reviewed. Constitutional:       General: She is not in acute distress. Appearance: Normal appearance. She is well-developed. She is not diaphoretic. Comments: ECOG 0. Pleasant and cooperative. HENT:      Head: Normocephalic and atraumatic. Mouth/Throat:      Pharynx: No oropharyngeal exudate. Eyes:      General: No scleral icterus. Right eye: No discharge. Left eye: No discharge. Conjunctiva/sclera: Conjunctivae normal.   Neck:      Musculoskeletal: Normal range of motion and neck supple. Thyroid: No thyromegaly. Vascular: No JVD. Trachea: No tracheal deviation. Cardiovascular:      Rate and Rhythm: Normal rate and regular rhythm. Heart sounds: No murmur. No friction rub. No gallop. Pulmonary:      Effort: Pulmonary effort is normal. No respiratory distress or retractions. Breath sounds: Normal breath sounds. No stridor. No wheezing or rales. Chest:      Chest wall: No mass, lacerations, deformity, swelling, tenderness or edema. Breasts: Breasts are symmetrical.         Right: No inverted nipple, mass, nipple discharge, skin change or tenderness. Left: No inverted nipple, mass, nipple discharge, skin change or tenderness. Comments: Bilateral reconstructed breasts.   Good symmetry and without clinically region extending to within 3 mm of the nipple into the upper outer quadrant.     -06/11/2014 BRCA 1/2 screening:  Negative for mutations.    -06/18/2014 Bilateral Breast MRI @ [address]:  Examination shows no linear, nodular or spiculated areas of   enhancement within the right breast. There is benign fibroglandular   enhancement seen in the right breast    Examination shows  nodular enhancement spanning from the posterior   extent of the nipple up towards the 1:00 region, measuring 97 mm   anterior to posterior by approximately 50 mm superior to inferior by   approximately 30 mm medial to lateral. There are biopsy site changes   seen in the retroareolar region and at the 1:00 location which   essentially marked the most anterior extent of the calcifications and   the near posterior extent of the calcifications. There are still   residual hematomas seen at both sites larger at the posterior aspect   measuring approximately 2 cm in longest dimension with the anterior   hematoma measuring 15 mm in longest dimension.  The lymph nodes are   symmetrical in their appearance bilaterally. Correlation is made with   the patients prior study.     -Status post bilateral skin sparing mastectomy with autologous reconstruction and left sentinel lymph node biopsy at [facility], [place], operated by [facility] on 7/7/2014 per Dr. Janel Stewart and Dr. Ronda Foster. Clinically, she is doing well and is without evidence of malignancy. For women who have undergone bilateral mastectomy, there is no role for adjuvant endocrine therapy. Recent inflammatory changes in both axilla compatible with inclusion cyst on exam.  Left axilla quiet except for some very minor follicular inflammation. Right axilla has a less than 1 cm firm inclusion cyst which is currently nontender and mobile, without any overlying erythema or visible pore.   Patient instructed to use heat if inflammation recurs and call us for advice. May consider antibiotics or excision. Questions answered to patient satisfaction. Plan:   1. Continue monthly breast/chest wall self examination; detailed instructions reviewed today. Bring any changes to your physician's attention. 2. Continue healthy diet and exercise routinely as tolerated. 3. Avoid alcohol or limit to < 3 drinks per week. 4. Limit caffeine intake. 5. No indication for Imaging. 6. Continue follow up with Primary Care/Gynecology. 7. While annual follow up is appropriate, she prefers close observation so will see her in back 6 months. During today's visit, face-to-face time 15 minutes, greater than 50% in counseling education and coordination of care. All questions were answered to her apparent satisfaction, and she is agreeable to the plan as outlined above.     Purvi Forrester MD. [facility]  September 22, 2020

## 2020-09-22 ENCOUNTER — OFFICE VISIT (OUTPATIENT)
Dept: BREAST CENTER | Age: 45
End: 2020-09-22
Payer: COMMERCIAL

## 2020-09-22 VITALS
RESPIRATION RATE: 16 BRPM | DIASTOLIC BLOOD PRESSURE: 72 MMHG | WEIGHT: 248 LBS | HEIGHT: 65 IN | BODY MASS INDEX: 41.32 KG/M2 | SYSTOLIC BLOOD PRESSURE: 128 MMHG | TEMPERATURE: 98.1 F | OXYGEN SATURATION: 98 % | HEART RATE: 92 BPM

## 2020-09-22 PROCEDURE — 99213 OFFICE O/P EST LOW 20 MIN: CPT | Performed by: SURGERY

## 2020-09-22 NOTE — LETTER
[facility]  [address] [address]  Phone: [phone]  Fax: [phone]    Ursula Prince MD        September 22, 2020     Wayne Jensen DO  [address] [address]    Patient: Carrie Kong  MR Number: [id number]  YOB: 1975  Date of Visit: 9/22/2020    Dear Dr. Black Camera:    Sophia Charles stopped in today to see us in follow-up of her left breast DCIS. Below are the relevant portions of my assessment and plan of care. 45 y.o. extremely pleasant female with history of screening detected stage 0 (Tis, N0, M0,) carcinoma in situ extensively involving the left breast, (measuring over a 9.7 cm region) diagnosed in 2016 at age 42. She has no first degree relatives with breast or gynecologic malignancies.     -05/28/2014 Stereotactic core needle biopsy of calcifications in the retroareolar breast as well as in the posterior 1:00 breast revealed ductal carcinoma in situ, solid and cribriform type, nuclear grade 2-3 with comedo necrosis and focal apocrine features.  No evidence of invasive carcinoma.    -06/10/2014:  Diagnostic mammogram at [facility] noted calcifications over a 9.7 cm region extending to within 3 mm of the nipple into the upper outer quadrant.     -06/11/2014 BRCA 1/2 screening:  Negative for mutations.    -06/18/2014 Bilateral Breast MRI @ [address]:  Examination shows no linear, nodular or spiculated areas of   enhancement within the right breast. There is benign fibroglandular   enhancement seen in the right breast    Examination shows  nodular enhancement spanning from the posterior   extent of the nipple up towards the 1:00 region, measuring 97 mm   anterior to posterior by approximately 50 mm superior to inferior by   approximately 30 mm medial to lateral. There are biopsy site changes   seen in the retroareolar region and at the 1:00 location which essentially marked the most anterior extent of the calcifications and   the near posterior extent of the calcifications. There are still   residual hematomas seen at both sites larger at the posterior aspect   measuring approximately 2 cm in longest dimension with the anterior   hematoma measuring 15 mm in longest dimension.  The lymph nodes are   symmetrical in their appearance bilaterally. Correlation is made with   the patients prior study.     -Status post bilateral skin sparing mastectomy with autologous reconstruction and left sentinel lymph node biopsy at [facility] in [place] on 7/7/2014 per Dr. Meliza Naqvi and Dr. Varun Caruso. Clinically, she is doing well and is without evidence of malignancy. For women who have undergone bilateral mastectomy, there is no role for adjuvant endocrine therapy. Recent inflammatory changes in both axilla compatible with inclusion cyst on exam.  Left axilla quiescesent except for some very minor follicular inflammation. Right axilla has a less than 1 cm firm inclusion cyst which is currently nontender and mobile, without any overlying erythema or visible pore. Patient instructed to use heat if inflammation recurs and call us for advice. May consider antibiotics or excision. Questions answered to patient satisfaction. If you have questions, please do not hesitate to call me. I look forward to following Cindy Jacobo along with you.     Sincerely,  Sonja Ocasio MD

## 2021-03-24 ASSESSMENT — ENCOUNTER SYMPTOMS
EYE ITCHING: 0
EYE DISCHARGE: 0
VOICE CHANGE: 0
CONSTIPATION: 0
NAUSEA: 0
SINUS PAIN: 0
BACK PAIN: 0
DIARRHEA: 0
RHINORRHEA: 0
CHEST TIGHTNESS: 0
TROUBLE SWALLOWING: 0
COUGH: 0
VOMITING: 0
WHEEZING: 0
SINUS PRESSURE: 0
SORE THROAT: 0
CHOKING: 0
ABDOMINAL DISTENTION: 0
SHORTNESS OF BREATH: 0
ABDOMINAL PAIN: 0
BLOOD IN STOOL: 0

## 2021-03-24 NOTE — PROGRESS NOTES
Subjective:  History of Left breast DCIS s/p Bilateral skin sparing mastectomy with autologous reconstruction and left SLN biopsy at [facility] 07/07/2014 per Dr. Selwyn Lockhart/Dr. Shaneka Moody. This note was copied forward from the last encounter. Essential components for this patient record were reviewed and verified on this visit including:  recent hospitalizations, recent imaging, PMH, PSH, FH, SOC HX, Allergies, and Medications were reviewed and updated as appropriate. In addition, the assessment and plan were copied from prior office note and updated accordingly. Patient ID: Xavier Saravia is a 39 y.o. female. She presents today for 6 month follow up. MIRYAM Enamorado is an extremely pleasant 39 y.o. female with a history of pre-menopausal (age 41-39) stage 0 (Tis, N0, M0) left breast ductal carcinoma in situ: ER/SC positive with H-score 190; SC positive H-score 95. She reports some recent inflammatory changes in both axillae. She underwent a baseline mammogram in May 2014 which revealed calcifications in the upper outer quadrant of the left breast over a 6 x 1.5 cm area which were deemed suspicious. A left breast ultrasound noted calcifications without mass.     -05/28/2014 Stereotactic core needle biopsy of calcifications in the retroareolar breast as well as in the posterior 1:00 breast revealed ductal carcinoma in situ, solid and cribriform type, nuclear grade 2-3 with comedo necrosis and focal apocrine features. No evidence of invasive carcinoma.    -06/10/2014:  Diagnostic mammogram at [facility] noted calcifications over a 9.7 cm region extending to within 3 mm of the nipple into the upper outer quadrant.      06/18/2014 Bilateral Breast MRI @ [facility]  FINDINGS:  Examination shows no linear, nodular or spiculated areas of   enhancement within the right breast. There is benign fibroglandular   enhancement seen in the right breast    Examination shows  nodular enhancement spanning from the posterior   extent of the nipple up towards the 1:00 region, measuring 97 mm   anterior to posterior by approximately 50 mm superior to inferior by   approximately 30 mm medial to lateral. There are biopsy site changes   seen in the retroareolar region and at the 1:00 location which   essentially marked the most anterior extent of the calcifications and   the near posterior extent of the calcifications. There are still   residual hematomas seen at both sites larger at the posterior aspect   measuring approximately 2 cm in longest dimension with the anterior   hematoma measuring 15 mm in longest dimension.  The lymph nodes are   symmetrical in their appearance bilaterally. Correlation is made with   the patients prior study.     -Status post bilateral skin sparing mastectomy with autologous reconstruction and left sentinel lymph node biopsy at [facility] in [place] on 2014 per Dr. Alondra Kam and Dr. Pedrito Thornton. Breast cancer risk factors include paternal great aunt with breast cancer at age 48; 1 (of this aunts daughters) paternal great cousins with breast cancer in their 46s. Hx skin cancer in the family, unknown which sub-type. Ashkenazi Anabaptist Ancestry: No.    OBSTETRIC RELATED HISTORY:  Age of menarche was 8. Currently on IUD Mirena since 2009. Patient denies hormonal therapy; an exception may be short term treatment she had for acne in the past.  Patient is . Age of first live birth was 34. Patient did breast feed.     CANCER SURVEILLANCE HISTORY:  Mammograms: Yes   Breast MRI's: Yes   Breast Biopsies: Yes   Colonoscopy: Yes   GI Polyps: No   EGD: No   Pelvic Exam: Yes/  Pap Smear: Yes/  Dermatology: Yes 2020; Rosacea   Lung screening: no    BMI 40.7  Bra Size: 42 DDD    Because violence is so common, we ask all our patients: are you in a relationship or do you live with a person who threatens, Minutes per session: Not on file    Stress: Not on file   Relationships    Social connections     Talks on phone: Not on file     Gets together: Not on file     Attends Rastafari service: Not on file     Active member of club or organization: Not on file     Attends meetings of clubs or organizations: Not on file     Relationship status: Not on file    Intimate partner violence     Fear of current or ex partner: Not on file     Emotionally abused: Not on file     Physically abused: Not on file     Forced sexual activity: Not on file   Other Topics Concern    Not on file   Social History Narrative    Not on file     No Known Allergies  Current Outpatient Medications on File Prior to Visit   Medication Sig Dispense Refill    ondansetron (ZOFRAN ODT) 4 MG disintegrating tablet Take 1 tablet by mouth every 8 hours as needed for Nausea or Vomiting (Patient not taking: Reported on 9/22/2020) 24 tablet 0    levothyroxine (SYNTHROID) 150 MCG tablet Take 150 mcg by mouth Daily       No current facility-administered medications on file prior to visit. 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 Review of Systems   Constitutional: Positive for fatigue. Negative for activity change, appetite change, chills, fever and unexpected weight change. August 2020 developed COVID; Developed alopecia in November. Continues to have malaise and fatigue. Busy with work (Chiropractic practice) and sports with her children. HENT: Negative for congestion, postnasal drip, rhinorrhea, sinus pressure, sinus pain, sore throat, trouble swallowing and voice change. Eyes: Negative for discharge, itching and visual disturbance. Respiratory: Negative for cough, choking, chest tightness, shortness of breath and wheezing. Cardiovascular: Negative for chest pain, palpitations and leg swelling.    Gastrointestinal: Negative for abdominal distention, abdominal pain, blood in stool, constipation, diarrhea, nausea and vomiting. Endocrine: Negative for cold intolerance and heat intolerance. Genitourinary: Negative for difficulty urinating, dysuria, frequency and hematuria. Musculoskeletal: Negative for arthralgias, back pain, gait problem, joint swelling, myalgias, neck pain and neck stiffness. Allergic/Immunologic: Negative for environmental allergies and food allergies. Neurological: Negative for dizziness, seizures, syncope, speech difficulty, weakness, light-headedness and headaches. Hematological: Negative for adenopathy. Does not bruise/bleed easily. Psychiatric/Behavioral: Negative for agitation, confusion and decreased concentration. The patient is not nervous/anxious. Objective:   Physical Exam  Vitals signs and nursing note reviewed. Constitutional:       General: She is not in acute distress. Appearance: Normal appearance. She is well-developed. She is not diaphoretic. Comments: ECOG remains stable at 0. Pleasant and cooperative. HENT:      Head: Normocephalic and atraumatic. Mouth/Throat:      Pharynx: No oropharyngeal exudate. Eyes:      General: No scleral icterus. Right eye: No discharge. Left eye: No discharge. Conjunctiva/sclera: Conjunctivae normal.   Neck:      Musculoskeletal: Normal range of motion and neck supple. Thyroid: No thyromegaly. Vascular: No JVD. Trachea: No tracheal deviation. Cardiovascular:      Rate and Rhythm: Normal rate and regular rhythm. Heart sounds: No murmur. No friction rub. No gallop. Pulmonary:      Effort: Pulmonary effort is normal. No respiratory distress or retractions. Breath sounds: Normal breath sounds. No stridor. No wheezing or rales. Chest:      Chest wall: No mass, lacerations, deformity, swelling, tenderness or edema. Breasts: Breasts are symmetrical.         Right: No inverted nipple, mass, nipple discharge, skin change or tenderness.          Left: No inverted nipple, mass, nipple discharge, skin change or tenderness. Comments: Bilateral reconstructed breasts. Good symmetry and without clinically suspicious findings. No axillary adenopathy. Bilateral tattooing of NAC (reports tattooing was in [place]). Good ROM and no edema of the bilateral UE. Abdominal:      General: There is no distension. Palpations: Abdomen is soft. Tenderness: There is no abdominal tenderness. There is no guarding or rebound. Musculoskeletal: Normal range of motion. General: No tenderness or deformity. Right shoulder: Normal.      Left shoulder: Normal.      Left elbow: She exhibits no swelling. Left wrist: She exhibits no swelling. Lymphadenopathy:      Cervical: No cervical adenopathy. Right cervical: No superficial, deep or posterior cervical adenopathy. Left cervical: No superficial, deep or posterior cervical adenopathy. Upper Body:      Right upper body: No pectoral adenopathy. Left upper body: No pectoral adenopathy. Skin:     General: Skin is warm and dry. Coloration: Skin is not pale. Findings: No erythema or rash. Neurological:      Mental Status: She is alert and oriented to person, place, and time. Coordination: Coordination normal.   Psychiatric:         Behavior: Behavior normal.         Thought Content: Thought content normal.         Judgment: Judgment normal.       Assessment:     39 y.o. extremely pleasant female with history of screening detected stage 0 (Tis, N0, M0,) carcinoma in situ extensively involving the left breast, (measuring over a 9.7 cm region) diagnosed in 2016 at age 42.   She has no first degree relatives with breast or gynecologic malignancies.     -05/28/2014 Stereotactic core needle biopsy of calcifications in the retroareolar breast as well as in the posterior 1:00 breast revealed ductal carcinoma in situ, solid and cribriform type, nuclear grade 2-3 with comedo necrosis and focal apocrine features. No evidence of invasive carcinoma.    -06/10/2014:  Diagnostic mammogram at [facility] noted calcifications over a 9.7 cm region extending to within 3 mm of the nipple into the upper outer quadrant.     -06/11/2014 BRCA 1/2 screening:  Negative for mutations.    -06/18/2014 Bilateral Breast MRI @ [address]:  Examination shows no linear, nodular or spiculated areas of   enhancement within the right breast. There is benign fibroglandular   enhancement seen in the right breast    Examination shows  nodular enhancement spanning from the posterior   extent of the nipple up towards the 1:00 region, measuring 97 mm   anterior to posterior by approximately 50 mm superior to inferior by   approximately 30 mm medial to lateral. There are biopsy site changes   seen in the retroareolar region and at the 1:00 location which   essentially marked the most anterior extent of the calcifications and   the near posterior extent of the calcifications. There are still   residual hematomas seen at both sites larger at the posterior aspect   measuring approximately 2 cm in longest dimension with the anterior   hematoma measuring 15 mm in longest dimension.  The lymph nodes are   symmetrical in their appearance bilaterally. Correlation is made with   the patients prior study.     -Status post bilateral skin sparing mastectomy with autologous reconstruction and left sentinel lymph node biopsy at [facility] in [place] on 7/7/2014 per Dr. Priscilla Gonzalez and Dr. Laith Peres.                  -03/26/2021 clinical follow-up she is doing fairly well. She had Covid in August 2020. He then reports in November she started to develop alopecia to the point where she now is having to wear a wig. She complains of residual fatigue and malaise. There is no evidence of recurrent disease. Will check labs today.   She does have a history of bilateral axillary

## 2021-03-26 ENCOUNTER — OFFICE VISIT (OUTPATIENT)
Dept: BREAST CENTER | Age: 46
End: 2021-03-26
Payer: COMMERCIAL

## 2021-03-26 VITALS
OXYGEN SATURATION: 98 % | DIASTOLIC BLOOD PRESSURE: 62 MMHG | WEIGHT: 245 LBS | HEART RATE: 84 BPM | RESPIRATION RATE: 18 BRPM | BODY MASS INDEX: 41.83 KG/M2 | SYSTOLIC BLOOD PRESSURE: 122 MMHG | HEIGHT: 64 IN | TEMPERATURE: 97 F

## 2021-03-26 DIAGNOSIS — R53.83 FATIGUE, UNSPECIFIED TYPE: ICD-10-CM

## 2021-03-26 DIAGNOSIS — L65.9 ALOPECIA: ICD-10-CM

## 2021-03-26 DIAGNOSIS — D05.10 DUCTAL CARCINOMA IN SITU (DCIS) OF BREAST, UNSPECIFIED LATERALITY: ICD-10-CM

## 2021-03-26 DIAGNOSIS — R53.83 FATIGUE, UNSPECIFIED TYPE: Primary | ICD-10-CM

## 2021-03-26 LAB
ALBUMIN SERPL-MCNC: 4.3 G/DL (ref 3.5–5.2)
ALP BLD-CCNC: 72 U/L (ref 35–104)
ALT SERPL-CCNC: 21 U/L (ref 0–32)
ANION GAP SERPL CALCULATED.3IONS-SCNC: 11 MMOL/L (ref 7–16)
AST SERPL-CCNC: 17 U/L (ref 0–31)
BASOPHILS ABSOLUTE: 0.08 E9/L (ref 0–0.2)
BASOPHILS RELATIVE PERCENT: 0.8 % (ref 0–2)
BILIRUB SERPL-MCNC: 0.3 MG/DL (ref 0–1.2)
BUN BLDV-MCNC: 12 MG/DL (ref 6–20)
CALCIUM SERPL-MCNC: 8.8 MG/DL (ref 8.6–10.2)
CHLORIDE BLD-SCNC: 104 MMOL/L (ref 98–107)
CO2: 24 MMOL/L (ref 22–29)
CREAT SERPL-MCNC: 0.7 MG/DL (ref 0.5–1)
EOSINOPHILS ABSOLUTE: 0.23 E9/L (ref 0.05–0.5)
EOSINOPHILS RELATIVE PERCENT: 2.4 % (ref 0–6)
FERRITIN: 144 NG/ML
FOLATE: 10.9 NG/ML (ref 4.8–24.2)
GFR AFRICAN AMERICAN: >60
GFR NON-AFRICAN AMERICAN: >60 ML/MIN/1.73
GLUCOSE BLD-MCNC: 132 MG/DL (ref 74–99)
HCT VFR BLD CALC: 43.4 % (ref 34–48)
HEMOGLOBIN: 14.5 G/DL (ref 11.5–15.5)
IMMATURE GRANULOCYTES #: 0.03 E9/L
IMMATURE GRANULOCYTES %: 0.3 % (ref 0–5)
IRON SATURATION: 24 % (ref 15–50)
IRON: 64 MCG/DL (ref 37–145)
LYMPHOCYTES ABSOLUTE: 2.49 E9/L (ref 1.5–4)
LYMPHOCYTES RELATIVE PERCENT: 26.1 % (ref 20–42)
MCH RBC QN AUTO: 29.2 PG (ref 26–35)
MCHC RBC AUTO-ENTMCNC: 33.4 % (ref 32–34.5)
MCV RBC AUTO: 87.3 FL (ref 80–99.9)
MONOCYTES ABSOLUTE: 0.71 E9/L (ref 0.1–0.95)
MONOCYTES RELATIVE PERCENT: 7.5 % (ref 2–12)
NEUTROPHILS ABSOLUTE: 5.99 E9/L (ref 1.8–7.3)
NEUTROPHILS RELATIVE PERCENT: 62.9 % (ref 43–80)
PDW BLD-RTO: 13.2 FL (ref 11.5–15)
PLATELET # BLD: 255 E9/L (ref 130–450)
PMV BLD AUTO: 10.9 FL (ref 7–12)
POTASSIUM SERPL-SCNC: 4.1 MMOL/L (ref 3.5–5)
RBC # BLD: 4.97 E12/L (ref 3.5–5.5)
SODIUM BLD-SCNC: 139 MMOL/L (ref 132–146)
T4 FREE: 1.74 NG/DL (ref 0.93–1.7)
TOTAL IRON BINDING CAPACITY: 272 MCG/DL (ref 250–450)
TOTAL PROTEIN: 7 G/DL (ref 6.4–8.3)
TSH SERPL DL<=0.05 MIU/L-ACNC: 0.04 UIU/ML (ref 0.27–4.2)
VITAMIN B-12: 505 PG/ML (ref 211–946)
WBC # BLD: 9.5 E9/L (ref 4.5–11.5)

## 2021-03-26 PROCEDURE — 36415 COLL VENOUS BLD VENIPUNCTURE: CPT | Performed by: NURSE PRACTITIONER

## 2021-03-26 PROCEDURE — 99212 OFFICE O/P EST SF 10 MIN: CPT | Performed by: NURSE PRACTITIONER

## 2021-03-26 PROCEDURE — 99213 OFFICE O/P EST LOW 20 MIN: CPT | Performed by: NURSE PRACTITIONER

## 2021-03-26 NOTE — PATIENT INSTRUCTIONS

## 2021-04-01 LAB — ERYTHROPOIETIN: 7 MU/ML (ref 4–27)

## 2021-09-17 ASSESSMENT — ENCOUNTER SYMPTOMS
RHINORRHEA: 0
VOICE CHANGE: 0
NAUSEA: 0
WHEEZING: 0
BLOOD IN STOOL: 0
SINUS PRESSURE: 0
VOMITING: 0
ABDOMINAL PAIN: 0
EYE ITCHING: 0
BACK PAIN: 0
TROUBLE SWALLOWING: 0
COUGH: 0
DIARRHEA: 0
CHOKING: 0
CHEST TIGHTNESS: 0
SHORTNESS OF BREATH: 0
ABDOMINAL DISTENTION: 0
SORE THROAT: 0
CONSTIPATION: 0
EYE DISCHARGE: 0
SINUS PAIN: 0

## 2021-09-17 NOTE — PROGRESS NOTES
Subjective:  History of Left breast DCIS s/p Bilateral skin sparing mastectomy with autologous reconstruction and left SLN biopsy at [facility] 07/07/2014 per Dr. Cecilia Hogue [facility]/Dr. Ewa Thomas. This note was copied forward from the last encounter. Essential components for this patient record were reviewed and verified on this visit including:  recent hospitalizations, recent imaging, PMH, PSH, FH, SOC HX, Allergies, and Medications were reviewed and updated as appropriate. In addition, the assessment and plan were copied from prior office note and updated accordingly. Patient ID: Aj Carrasco is a 39 y.o. female. She presents today for 6 month follow up. MIRYAM Lindsey is an extremely pleasant 39 y.o. female with a history of pre-menopausal (age 41-39) stage 0 (Tis, N0, M0) left breast ductal carcinoma in situ: ER/MT positive with H-score 190; MT positive H-score 95. She reports occasional inflammatory changes in both axillae. No palpable masses in either breast.     Historically, she underwent a baseline mammogram in May 2014 which revealed calcifications in the upper outer quadrant of the left breast over a 6 x 1.5 cm area which were deemed suspicious. A left breast ultrasound noted calcifications without mass.     -05/28/2014 Stereotactic core needle biopsy of calcifications in the retroareolar breast as well as in the posterior 1:00 breast revealed ductal carcinoma in situ, solid and cribriform type, nuclear grade 2-3 with comedo necrosis and focal apocrine features. No evidence of invasive carcinoma.    -06/10/2014:  Diagnostic mammogram at [facility] noted calcifications over a 9.7 cm region extending to within 3 mm of the nipple into the upper outer quadrant.      06/18/2014 Bilateral Breast MRI @ [facility]  FINDINGS:  Examination shows no linear, nodular or spiculated areas of   enhancement within the right breast. There is benign fibroglandular   enhancement seen in the right breast    Examination shows  nodular enhancement spanning from the posterior   extent of the nipple up towards the 1:00 region, measuring 97 mm   anterior to posterior by approximately 50 mm superior to inferior by   approximately 30 mm medial to lateral. There are biopsy site changes   seen in the retroareolar region and at the 1:00 location which   essentially marked the most anterior extent of the calcifications and   the near posterior extent of the calcifications. There are still   residual hematomas seen at both sites larger at the posterior aspect   measuring approximately 2 cm in longest dimension with the anterior   hematoma measuring 15 mm in longest dimension.  The lymph nodes are   symmetrical in their appearance bilaterally. Correlation is made with   the patients prior study.     -Status post bilateral skin sparing mastectomy with autologous reconstruction and left sentinel lymph node biopsy at [facility] in [place] on 2014 per Dr. Romy Atkinson and Dr. Matthew Encarnacion. Breast cancer risk factors include paternal great aunt with breast cancer at age 48; 1 (of this aunts daughters) paternal great cousins with breast cancer in their 46s. Hx skin cancer in the family, unknown which sub-type. Ashkenazi Hinduism Ancestry: No.    Had Covid last September. OBSTETRIC RELATED HISTORY:  Age of menarche was 8. Currently on IUD Mirena since 2009. Patient denies hormonal therapy; an exception may be short term treatment she had for acne in the past.  Patient is . Age of first live birth was 34. Patient did breast feed.     CANCER SURVEILLANCE HISTORY:  Mammograms: Yes   Breast MRI's: Yes   Breast Biopsies: Yes   Colonoscopy: Yes   GI Polyps: No   EGD: No   Pelvic Exam: Yes  Pap Smear: Yes  Dermatology: Yes 2020; Rosacea   Lung screening: no    BMI 40.7  Bra Size: 42 DDD    Because violence is so common, we ask all our patients: are you in a relationship or do you live with a person who threatens, hurts, or controls you:  Safe. Patient drinks little caffeinated beverages. Patient does not smoke cigarettes. Patient does not use recreational drugs. Past Medical History:   Diagnosis Date    Abnormal glandular Papanicolaou smear of cervix     Anemia, unspecified     [address] [address]     left breast -Bilateral mastectomy with tram flap reconstruction    Disease of thyroid gland     multinodular; benign biopsy    Malignant neoplasm of breast (female) ([facility] [address]     DCIS Left breast       Past Medical History:   Diagnosis Date    Abnormal glandular Papanicolaou smear of cervix     Anemia, unspecified     Cancer ([facility] [address]     left breast -Bilateral mastectomy with tram flap reconstruction    Disease of thyroid gland     multinodular; benign biopsy    Malignant neoplasm of breast (female) ([facility] [address]     DCIS Left breast      Past Surgical History:   Procedure Laterality Date    BREAST RECONSTRUCTION Bilateral     Secondary to extensive Left breast DCIS     SECTION      TEMPOROMANDIBULAR JOINT SURGERY      THYROIDECTOMY  2018    multi nodular thyroid. She was having trouble swallowing.      Social History     Socioeconomic History    Marital status:      Spouse name: Not on file    Number of children: Not on file    Years of education: Not on file    Highest education level: Not on file   Occupational History    Occupation: Chiropractor     Employer: SELF   Tobacco Use    Smoking status: Never Smoker    Smokeless tobacco: Never Used   Vaping Use    Vaping Use: Never used   Substance and Sexual Activity    Alcohol use: Yes     Comment: occasional    Drug use: Never    Sexual activity: Not on file   Other Topics Concern    Not on file   Social History Narrative    Not on file     Social Determinants of Health     Financial Resource Strain:     Difficulty of Paying Living constipation, diarrhea, nausea and vomiting. Endocrine: Negative for cold intolerance and heat intolerance. Genitourinary: Negative for difficulty urinating, dysuria, frequency and hematuria. Musculoskeletal: Negative for arthralgias, back pain, gait problem, joint swelling, myalgias, neck pain and neck stiffness. Allergic/Immunologic: Negative for environmental allergies and food allergies. Neurological: Negative for dizziness, seizures, syncope, speech difficulty, weakness, light-headedness and headaches. Hematological: Negative for adenopathy. Does not bruise/bleed easily. Psychiatric/Behavioral: Negative for agitation, confusion and decreased concentration. The patient is not nervous/anxious. Objective:   Physical Exam  Vitals and nursing note reviewed. Constitutional:       General: She is not in acute distress. Appearance: Normal appearance. She is well-developed. She is obese. She is not diaphoretic. Comments: ECOG remains stable at 0. Pleasant and cooperative. HENT:      Head: Normocephalic and atraumatic. Mouth/Throat:      Pharynx: No oropharyngeal exudate. Eyes:      General: No scleral icterus. Right eye: No discharge. Left eye: No discharge. Conjunctiva/sclera: Conjunctivae normal.   Neck:      Thyroid: No thyromegaly. Vascular: No JVD. Trachea: No tracheal deviation. Cardiovascular:      Rate and Rhythm: Normal rate and regular rhythm. Heart sounds: No murmur heard. No friction rub. No gallop. Pulmonary:      Effort: Pulmonary effort is normal. No respiratory distress or retractions. Breath sounds: Normal breath sounds. No stridor. No wheezing or rales. Chest:      Chest wall: No mass, lacerations, deformity, swelling, tenderness or edema. Breasts: Breasts are symmetrical.         Right: No inverted nipple, mass, nipple discharge, skin change or tenderness.          Left: No inverted nipple, mass, nipple discharge, skin change or tenderness. Comments: Bilateral reconstructed breasts. Good symmetry and without clinically suspicious findings. Nipple/areola tattoos bilaterally  (reports tattooing was in [place]). No axillary adenopathy. Good ROM and no edema of the bilateral UE. Abdominal:      General: There is no distension. Palpations: Abdomen is soft. Tenderness: There is no abdominal tenderness. There is no guarding or rebound. Musculoskeletal:         General: No tenderness or deformity. Normal range of motion. Right shoulder: Normal. Normal range of motion. Left shoulder: Normal. Normal range of motion. Left elbow: No swelling. Left wrist: No swelling. Cervical back: Normal range of motion and neck supple. Lymphadenopathy:      Cervical: No cervical adenopathy. Right cervical: No superficial, deep or posterior cervical adenopathy. Left cervical: No superficial, deep or posterior cervical adenopathy. Upper Body:      Right upper body: No pectoral adenopathy. Left upper body: No pectoral adenopathy. Skin:     General: Skin is warm and dry. Coloration: Skin is not pale. Findings: No erythema or rash. Neurological:      Mental Status: She is alert and oriented to person, place, and time. Coordination: Coordination normal.   Psychiatric:         Behavior: Behavior normal.         Thought Content: Thought content normal.         Judgment: Judgment normal.       Assessment:     39 y.o. extremely pleasant female with history of screening detected stage 0 (Tis, N0, M0,) carcinoma in situ extensively involving the left breast, (measuring over a 9.7 cm region) diagnosed in 2016 at age 42.   She has no first degree relatives with breast or gynecologic malignancies.     -05/28/2014 Stereotactic core needle biopsy of calcifications in the retroareolar breast as well as in the posterior 1:00 breast revealed ductal carcinoma in situ, solid and cribriform type, nuclear grade 2-3 with comedo necrosis and focal apocrine features. No evidence of invasive carcinoma.    -06/10/2014:  Diagnostic mammogram at [facility] noted calcifications over a 9.7 cm region extending to within 3 mm of the nipple into the upper outer quadrant.     -06/11/2014 BRCA 1/2 screening:  Negative for mutations.    -06/18/2014 Bilateral Breast MRI @ [address]:  Examination shows no linear, nodular or spiculated areas of   enhancement within the right breast. There is benign fibroglandular   enhancement seen in the right breast    Examination shows  nodular enhancement spanning from the posterior   extent of the nipple up towards the 1:00 region, measuring 97 mm   anterior to posterior by approximately 50 mm superior to inferior by   approximately 30 mm medial to lateral. There are biopsy site changes   seen in the retroareolar region and at the 1:00 location which   essentially marked the most anterior extent of the calcifications and   the near posterior extent of the calcifications. There are still   residual hematomas seen at both sites larger at the posterior aspect   measuring approximately 2 cm in longest dimension with the anterior   hematoma measuring 15 mm in longest dimension.  The lymph nodes are   symmetrical in their appearance bilaterally. Correlation is made with   the patients prior study.     -Status post bilateral skin sparing mastectomy with autologous reconstruction and left sentinel lymph node biopsy at [facility] in [place] on 7/7/2014 per Dr. Ismael Ragsdale and Dr. Rolanda Jo.                  -03/26/2021 clinical follow-up she is doing fairly well. She had Covid in August 2020. He then reports in November she started to develop alopecia to the point where she now is having to wear a wig. There is no evidence of recurrent disease.     She does have a history of bilateral axillary inclusion cysts; no inflammatory lesions noted on this exam.  9/21/21 No evidence of recurrent disease  Going forward, needs a good CBE once yearly and monthly BSE. Plan:   1. Continue monthly breast/chest wall self examination; detailed instructions reviewed today. Bring any changes to your physician's attention. 2. Continue healthy diet and exercise routinely as tolerated. 3. Avoid alcohol or limit to < 3 drinks per week. 4. Limit caffeine intake. 5. No indication for Imaging. 6. Continue follow up with Primary Care/Gynecology. 7. She prefers close observation; return to see me in 12 months for clinical exam per her request.        During today's visit, face-to-face time 25 minutes, greater than 50% in counseling education and coordination of care. All questions were answered to her apparent satisfaction, and she is agreeable to the plan as outlined above. Dr. Kenyon Callander.  Alexis Ruiz MD [facility]  September 21, 2021

## 2021-09-21 ENCOUNTER — OFFICE VISIT (OUTPATIENT)
Dept: BREAST CENTER | Age: 46
End: 2021-09-21
Payer: COMMERCIAL

## 2021-09-21 VITALS
DIASTOLIC BLOOD PRESSURE: 68 MMHG | WEIGHT: 250 LBS | HEART RATE: 78 BPM | TEMPERATURE: 98.1 F | RESPIRATION RATE: 18 BRPM | HEIGHT: 65 IN | OXYGEN SATURATION: 98 % | BODY MASS INDEX: 41.65 KG/M2 | SYSTOLIC BLOOD PRESSURE: 124 MMHG

## 2021-09-21 DIAGNOSIS — D05.10 DUCTAL CARCINOMA IN SITU (DCIS) OF BREAST, UNSPECIFIED LATERALITY: ICD-10-CM

## 2021-09-21 PROCEDURE — 99213 OFFICE O/P EST LOW 20 MIN: CPT | Performed by: SURGERY

## 2021-09-21 PROCEDURE — 99214 OFFICE O/P EST MOD 30 MIN: CPT | Performed by: SURGERY

## 2021-12-30 ENCOUNTER — OFFICE VISIT (OUTPATIENT)
Dept: FAMILY MEDICINE CLINIC | Age: 46
End: 2021-12-30
Payer: COMMERCIAL

## 2021-12-30 VITALS
DIASTOLIC BLOOD PRESSURE: 80 MMHG | WEIGHT: 253 LBS | TEMPERATURE: 97.1 F | SYSTOLIC BLOOD PRESSURE: 120 MMHG | HEART RATE: 102 BPM | BODY MASS INDEX: 42.1 KG/M2 | OXYGEN SATURATION: 97 %

## 2021-12-30 DIAGNOSIS — R05.9 COUGH: Primary | ICD-10-CM

## 2021-12-30 LAB
Lab: NORMAL
PERFORMING INSTRUMENT: NORMAL
QC PASS/FAIL: NORMAL
SARS-COV-2, POC: NORMAL

## 2021-12-30 PROCEDURE — 87426 SARSCOV CORONAVIRUS AG IA: CPT | Performed by: FAMILY MEDICINE

## 2021-12-30 PROCEDURE — 99213 OFFICE O/P EST LOW 20 MIN: CPT | Performed by: FAMILY MEDICINE

## 2021-12-30 RX ORDER — AMOXICILLIN AND CLAVULANATE POTASSIUM 875; 125 MG/1; MG/1
1 TABLET, FILM COATED ORAL 2 TIMES DAILY
Qty: 20 TABLET | Refills: 0 | Status: SHIPPED | OUTPATIENT
Start: 2021-12-30 | End: 2022-01-09

## 2021-12-30 NOTE — PROGRESS NOTES
Sharmaine Dye In    Ynes Rider presents to the office today for   Chief Complaint   Patient presents with    Cough    Pharyngitis     Cough  Started 12/26  Sore throat past few days  No fever  No loss of taste or smell  No n/v/d  Mom sick with viral URI unspecified    Review of Systems     /80   Pulse 102   Temp 97.1 °F (36.2 °C)   Wt 253 lb (114.8 kg)   SpO2 97%   BMI 42.10 kg/m²   Physical Exam  Constitutional:       Appearance: Normal appearance. HENT:      Head: Normocephalic and atraumatic. Nose: Congestion present. Eyes:      Extraocular Movements: Extraocular movements intact. Conjunctiva/sclera: Conjunctivae normal.   Cardiovascular:      Rate and Rhythm: Normal rate. Heart sounds: Normal heart sounds. Pulmonary:      Effort: Pulmonary effort is normal.      Breath sounds: Normal breath sounds. Skin:     General: Skin is warm. Neurological:      Mental Status: She is alert and oriented to person, place, and time. Psychiatric:         Mood and Affect: Mood normal.         Behavior: Behavior normal.            Current Outpatient Medications:     dextromethorphan-guaiFENesin (MUCINEX DM)  MG per extended release tablet, Take 1 tablet by mouth every 12 hours as needed, Disp: , Rfl:     amoxicillin-clavulanate (AUGMENTIN) 875-125 MG per tablet, Take 1 tablet by mouth 2 times daily for 10 days, Disp: 20 tablet, Rfl: 0    levothyroxine (SYNTHROID) 150 MCG tablet, Take 150 mcg by mouth Daily, Disp: , Rfl:      Past Medical History:   Diagnosis Date    Abnormal glandular Papanicolaou smear of cervix 2011    Anemia, unspecified     Cancer ([facility] [address] 2014    left breast -Bilateral mastectomy with tram flap reconstruction    Disease of thyroid gland     multinodular; benign biopsy    Malignant neoplasm of breast (female) ([facility] [address] 2014    DCIS Left breast 2014       Larry Edmonds was seen today for cough and pharyngitis.     Diagnoses and all orders for this visit:    Cough  - POCT COVID-19, Antigen  -     amoxicillin-clavulanate (AUGMENTIN) 875-125 MG per tablet;  Take 1 tablet by mouth 2 times daily for 10 days       Rapid COVID 19   Augmentin for sinusitis/bronchitis      Maki Abernathy MD

## 2022-06-07 ENCOUNTER — OFFICE VISIT (OUTPATIENT)
Dept: FAMILY MEDICINE CLINIC | Age: 47
End: 2022-06-07
Payer: COMMERCIAL

## 2022-06-07 VITALS
HEART RATE: 66 BPM | WEIGHT: 251.8 LBS | HEIGHT: 64 IN | BODY MASS INDEX: 42.99 KG/M2 | DIASTOLIC BLOOD PRESSURE: 80 MMHG | OXYGEN SATURATION: 99 % | TEMPERATURE: 98.1 F | SYSTOLIC BLOOD PRESSURE: 118 MMHG

## 2022-06-07 DIAGNOSIS — K59.09 OTHER CONSTIPATION: Primary | ICD-10-CM

## 2022-06-07 DIAGNOSIS — R73.01 IMPAIRED FASTING BLOOD SUGAR: ICD-10-CM

## 2022-06-07 DIAGNOSIS — K59.09 OTHER CONSTIPATION: ICD-10-CM

## 2022-06-07 DIAGNOSIS — D05.10 DUCTAL CARCINOMA IN SITU (DCIS) OF BREAST, UNSPECIFIED LATERALITY: ICD-10-CM

## 2022-06-07 LAB — HBA1C MFR BLD: 5.4 % (ref 4–5.6)

## 2022-06-07 PROCEDURE — 99214 OFFICE O/P EST MOD 30 MIN: CPT | Performed by: FAMILY MEDICINE

## 2022-06-07 RX ORDER — LEVOTHYROXINE SODIUM 112 MCG
112 TABLET ORAL DAILY
COMMUNITY

## 2022-06-07 ASSESSMENT — ENCOUNTER SYMPTOMS
CHEST TIGHTNESS: 0
DIARRHEA: 0
EYE PAIN: 0
NAUSEA: 0
TROUBLE SWALLOWING: 0
VOMITING: 0
SORE THROAT: 0
COUGH: 0
SINUS PAIN: 0
BACK PAIN: 0
SHORTNESS OF BREATH: 0
CONSTIPATION: 1
WHEEZING: 0
ABDOMINAL PAIN: 1

## 2022-06-07 ASSESSMENT — PATIENT HEALTH QUESTIONNAIRE - PHQ9
SUM OF ALL RESPONSES TO PHQ QUESTIONS 1-9: 0
SUM OF ALL RESPONSES TO PHQ QUESTIONS 1-9: 0
1. LITTLE INTEREST OR PLEASURE IN DOING THINGS: 0
SUM OF ALL RESPONSES TO PHQ QUESTIONS 1-9: 0
SUM OF ALL RESPONSES TO PHQ QUESTIONS 1-9: 0
SUM OF ALL RESPONSES TO PHQ9 QUESTIONS 1 & 2: 0
2. FEELING DOWN, DEPRESSED OR HOPELESS: 0

## 2022-06-07 NOTE — PROGRESS NOTES
22    Name: Arleen Baxter  :1975   Sex:female   Age:46 y.o. Chief Complaint   Patient presents with    Constipation     Patient presents to office for visit. She has been having a lot of constipation recently. Patient says constipation is common for her, but it has worsened recently. It could be 8 to 10 days in between bowel movements. Patient will get severe abdominal pain and cramping and sometimes have vomiting. She is concerned for bowel blockage because she has had a few abdominal surgeries that could be causing scar tissue. She has not taken katie lax or any otc laxative. Here with issues of constipation  Was seeing GI for a while years ago and was on linzess but it did not work and she cannot remember why  She had a colonosocpy in  at [facility] so we will get a copy of that  She cannot remember what it showed    She drinks plenty of water  She will go 1 to 2 weeks before going  By the bristal scale her stools are a 7 pretty much  She says what goes in does not ever match what comes out  They are very hard  She has tried suppositories hear and there but that's it  She never really has a day where her addomen feels good b/c she had a bowel movement  When it gets really bad she will have cramping on the right or left side , it alternates    We discussed options  Will start with miralax twice a day for 2 weeks then 3 to 4 times a day  And prunes at dinner daily    Did advise if she wants to clean herself out she can do m ag citrate 2 bottle about 4 to 6 hours apart  This should get her cleaned out  But will still n eed daily katie lax    Thyroid labs just done and they are okay  Will get a1c today and lipids      Review of Systems   Constitutional: Negative for appetite change, fatigue and fever. HENT: Negative for congestion, ear pain, sinus pain, sore throat and trouble swallowing. Eyes: Negative for pain.    Respiratory: Negative for cough, chest tightness, shortness of breath and wheezing. Cardiovascular: Negative for chest pain, palpitations and leg swelling. Gastrointestinal: Positive for abdominal pain and constipation. Negative for diarrhea, nausea and vomiting. Endocrine: Negative for cold intolerance and heat intolerance. Genitourinary: Negative for difficulty urinating, dysuria, frequency, hematuria and pelvic pain. Musculoskeletal: Negative for arthralgias, back pain, gait problem, joint swelling and myalgias. Skin: Negative for rash and wound. Neurological: Negative for dizziness, syncope and headaches. Hematological: Negative for adenopathy. Psychiatric/Behavioral: Negative for confusion, sleep disturbance and suicidal ideas. Current Outpatient Medications:     SYNTHROID 112 MCG tablet, Take 112 mcg by mouth Daily, Disp: , Rfl:   Allergies   Allergen Reactions    Azithromycin     Red Dye       Past Medical History:   Diagnosis Date    Abnormal glandular Papanicolaou smear of cervix     Anemia, unspecified     [facility] [address]     left breast -Bilateral mastectomy with tram flap reconstruction    Disease of thyroid gland     multinodular; benign biopsy    Malignant neoplasm of breast (female) ([facility] [address]     DCIS Left breast      Patient Active Problem List    Diagnosis Date Noted    Other constipation 2022    DCIS (ductal carcinoma in situ) of breast 2014      Past Surgical History:   Procedure Laterality Date    BREAST RECONSTRUCTION Bilateral     Secondary to extensive Left breast DCIS     SECTION      TEMPOROMANDIBULAR JOINT SURGERY      THYROIDECTOMY  2018    multi nodular thyroid. She was having trouble swallowing.       Social History     Tobacco History     Smoking Status  Never Smoker    Smokeless Tobacco Use  Never Used          Alcohol History     Alcohol Use Status  Yes Comment  occasional          Drug Use     Drug Use Status  Never          Sexual Activity     Sexually Active  Not Asked /80   Pulse 66   Temp 98.1 °F (36.7 °C)   Ht 5' 4\" (1.626 m)   Wt 251 lb 12.8 oz (114.2 kg)   SpO2 99%   BMI 43.22 kg/m²     EXAM:   Physical Exam  Vitals and nursing note reviewed. Constitutional:       General: She is not in acute distress. Appearance: She is well-developed. She is obese. She is not ill-appearing. HENT:      Head: Normocephalic and atraumatic. Right Ear: Tympanic membrane normal.      Left Ear: Tympanic membrane normal.      Nose: Nose normal.      Mouth/Throat:      Mouth: Mucous membranes are moist.   Eyes:      Pupils: Pupils are equal, round, and reactive to light. Cardiovascular:      Rate and Rhythm: Normal rate and regular rhythm. Pulmonary:      Effort: Pulmonary effort is normal.      Breath sounds: Normal breath sounds. Abdominal:      General: Bowel sounds are normal. There is distension. Comments: Abdomen hard, likely from constipation   Musculoskeletal:      Cervical back: Normal range of motion. Skin:     General: Skin is warm and dry. Neurological:      Mental Status: She is alert and oriented to person, place, and time. Mental status is at baseline. Psychiatric:         Mood and Affect: Mood normal.         Thought Content: Thought content normal.          Chester Lindsey was seen today for constipation. Diagnoses and all orders for this visit:    Other constipation  Comments:  significant  xrays today  miralax bid x 2 weeks then 3 to 4 times a day  prunes daily w/ dinner  recheck 1 month  Orders:  -     Hemoglobin A1C; Future  -     Lipid Panel; Future  -     XR ABDOMEN (2 VIEWS); Future    Impaired fasting blood sugar  Comments:  screening labs today  she is due    Orders:  -     Hemoglobin A1C; Future  -     Lipid Panel;  Future    Ductal carcinoma in situ (DCIS) of breast, unspecified laterality  Comments:  seeing [facility] and mammograms ordered by them        I independently reviewed and updated the chief complaint, HPI, past medical and surgical history, medications, allergies and ROS as entered by the LPN. Seen by:   Kam Simms DO

## 2022-06-08 LAB
CHOLESTEROL, TOTAL: 171 MG/DL (ref 0–199)
HDLC SERPL-MCNC: 44 MG/DL
LDL CHOLESTEROL CALCULATED: 100 MG/DL (ref 0–99)
TRIGL SERPL-MCNC: 135 MG/DL (ref 0–149)
VLDLC SERPL CALC-MCNC: 27 MG/DL

## 2022-08-17 RX ORDER — PREDNISONE 10 MG/1
10 TABLET ORAL 2 TIMES DAILY
Qty: 14 TABLET | Refills: 0 | Status: SHIPPED | OUTPATIENT
Start: 2022-08-17 | End: 2022-08-24

## 2022-09-12 ASSESSMENT — ENCOUNTER SYMPTOMS
TROUBLE SWALLOWING: 0
SHORTNESS OF BREATH: 0
COUGH: 0
BACK PAIN: 0
VOICE CHANGE: 0
WHEEZING: 0

## 2022-09-12 NOTE — PROGRESS NOTES
Summary: History of Left breast DCIS s/p Bilateral skin sparing mastectomy with autologous reconstruction and left SLN biopsy at [facility] 07/07/2014 per Dr. Olena Lockhart/Dr. Sho Jacobs. This note was copied forward from the last encounter. Essential components for this patient record were reviewed and verified on this visit including:  recent hospitalizations, recent imaging, PMH, PSH, FH, SOC HX, Allergies, and Medications were reviewed and updated as appropriate. In addition, the assessment and plan were copied from prior office note and updated accordingly. Patient ID: Jimmy Garcia is a 42 y.o. female. She presents today for 6 month follow up. MIRYAM Yang is an extremely pleasant 42 y.o. female with a history of pre-menopausal (age 41) stage 0 (Tis, N0, M0) extensive left breast ductal carcinoma in situ: ER/MA positive with H-score 190; MA positive H-score 95. Historically, she underwent a baseline mammogram in May 2014 which revealed calcifications in the upper outer quadrant of the left breast over a 6 x 1.5 cm area which were deemed suspicious. A left breast ultrasound noted calcifications without mass. 05/28/2014 Stereotactic core needle biopsy of calcifications in the retroareolar breast as well as in the posterior 1:00 breast revealed ductal carcinoma in situ, solid and cribriform type, nuclear grade 2-3 with comedo necrosis and focal apocrine features. No evidence of invasive carcinoma. -6/10/2014:  Diagnostic mammogram at [facility] noted calcifications over a 9.7 cm region extending to within 3 mm of the nipple into the upper outer quadrant.      06/18/2014 Bilateral Breast MRI @ [facility]  FINDINGS:  Examination shows no linear, nodular or spiculated areas of enhancement within the right breast. There is benign fibroglandular enhancement seen in the right breast.  Examination shows  nodular enhancement spanning from the posterior extent of the nipple up towards the 1:00 region, measuring 97 mm anterior to posterior by approximately 50 mm superior to inferior by approximately 30 mm medial to lateral. There are biopsy site changes seen in the retroareolar region and at the 1:00 location which essentially marked the most anterior extent of the calcifications and the near posterior extent of the calcifications. There are still residual hematomas seen at both sites larger at the posterior aspect measuring approximately 2 cm in longest dimension with the anterior hematoma measuring 15 mm in longest dimension. The lymph nodes are symmetrical in their appearance bilaterally. Correlation is made with the patients prior study.     -Status post bilateral skin sparing mastectomy with autologous reconstruction and left sentinel lymph node biopsy at [facility] in [place] on 2014 per Dr. Basilio Blanton and Dr. Ruth Rodriguez. Breast cancer risk factors include paternal great aunt with breast cancer at age 48; 1 (of this aunts daughters) paternal great cousins with breast cancer in their 46s. Hx skin cancer in the family, unknown which sub-type. Ashkenazi Anglican Ancestry: No.  Obstetric related history: Age of menarche was 8. Patient is . Age of first live birth was 34. Patient did breast feed. 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 Review of Systems   Constitutional:         She reports she is feeling generally well. HENT:  Negative for trouble swallowing and voice change. Respiratory:  Negative for cough, shortness of breath and wheezing. Cardiovascular:  Negative for chest pain and palpitations. Gastrointestinal:  Positive for abdominal pain (LLQ abdominal pain) and constipation. Negative for abdominal distention, blood in stool, diarrhea, nausea and rectal pain. She has chronic constipation and has developed LLQ abdominal discomfort.   Last colonoscopy was 4-5 years ago and reported as negative for polyps. Still has her ovaries and she follows with Gynecology annually. CT scan of the abdomen ordered per GI. Musculoskeletal:  Negative for arthralgias and back pain. Neurological:  Negative for headaches. Psychiatric/Behavioral:  The patient is not nervous/anxious. Objective:   Physical Exam  Vitals and nursing note reviewed. Constitutional:       General: She is not in acute distress. Appearance: Normal appearance. She is well-developed. She is not diaphoretic. Comments: ECOG once again remains stable at 0. Pleasant and cooperative and in no apparent distress. HENT:      Head: Normocephalic and atraumatic. Mouth/Throat:      Pharynx: No oropharyngeal exudate. Eyes:      General: No scleral icterus. Right eye: No discharge. Left eye: No discharge. Conjunctiva/sclera: Conjunctivae normal.   Neck:      Thyroid: No thyromegaly. Vascular: No JVD. Trachea: No tracheal deviation. Cardiovascular:      Rate and Rhythm: Normal rate and regular rhythm. Heart sounds: No murmur heard. No friction rub. No gallop. Pulmonary:      Effort: Pulmonary effort is normal. No respiratory distress or retractions. Breath sounds: Normal breath sounds. No stridor. No wheezing or rales. Chest:      Chest wall: No mass, lacerations, deformity, swelling, tenderness or edema. Breasts:     Breasts are symmetrical.      Right: No inverted nipple, mass, nipple discharge, skin change or tenderness. Left: No inverted nipple, mass, nipple discharge, skin change or tenderness. Comments: Bilateral reconstructed breasts. Good symmetry with nipple/areola tattoos bilaterally  (reports tattooing was in [place]). No axillary adenopathy. No evidence of recurrent disease. Abdominal:      General: There is no distension. Palpations: Abdomen is soft. There is no mass. Tenderness: There is no abdominal tenderness.  There is no right CVA tenderness, left CVA tenderness, guarding or rebound. Hernia: No hernia is present. Musculoskeletal:         General: No tenderness or deformity. Normal range of motion. Right shoulder: Normal. Normal range of motion. Left shoulder: Normal. Normal range of motion. Left elbow: No swelling. Left wrist: No swelling. Cervical back: Normal range of motion and neck supple. Lymphadenopathy:      Cervical: No cervical adenopathy. Right cervical: No superficial, deep or posterior cervical adenopathy. Left cervical: No superficial, deep or posterior cervical adenopathy. Upper Body:      Right upper body: No pectoral adenopathy. Left upper body: No pectoral adenopathy. Skin:     General: Skin is warm and dry. Coloration: Skin is not pale. Findings: No erythema or rash. Neurological:      Mental Status: She is alert and oriented to person, place, and time. Coordination: Coordination normal.   Psychiatric:         Behavior: Behavior normal.         Thought Content: Thought content normal.         Judgment: Judgment normal.     Assessment:     Kaylah Hurst is a 42 y.o. extremely pleasant female with history of screening detected stage 0 (Tis, N0, M0,) carcinoma in situ extensively involving the left breast, (measuring over a 9.7 cm region) diagnosed in 2016 at age 42. She has no first degree relatives with breast or gynecologic malignancies.     -05/28/2014 Stereotactic core needle biopsy of calcifications in the retroareolar breast as well as in the posterior 1:00 breast revealed ductal carcinoma in situ, solid and cribriform type, nuclear grade 2-3 with comedo necrosis and focal apocrine features. No evidence of invasive carcinoma.    06/10/2014:  Diagnostic mammogram at [facility] noted calcifications over a 9.7 cm region extending to within 3 mm of the nipple into the upper outer quadrant.    06/11/2014 BRCA 1/2 screening:  Negative for mutations. 06/18/2014 Bilateral Breast MRI @ [facility]  FINDINGS:  Examination shows no linear, nodular or spiculated areas of   enhancement within the right breast. There is benign fibroglandular   enhancement seen in the right breast    Examination shows  nodular enhancement spanning from the posterior extent of the nipple up towards the 1:00 region, measuring 97 mm   anterior to posterior by approximately 50 mm superior to inferior by approximately 30 mm medial to lateral. There are biopsy site changes   seen in the retroareolar region and at the 1:00 location which essentially marked the most anterior extent of the calcifications and   the near posterior extent of the calcifications. There are still residual hematomas seen at both sites larger at the posterior aspect   measuring approximately 2 cm in longest dimension with the anterior hematoma measuring 15 mm in longest dimension. The lymph nodes are   symmetrical in their appearance bilaterally. Correlation is made with the patients prior study. 07/07/2014 Status post bilateral skin sparing mastectomy with autologous reconstruction and left sentinel lymph node biopsy at [facility] in [place] per Dr. Basilio Blanton and Dr. Ruth Rodriguez. 03/26/2021 clinical follow-up she is doing fairly well. She had Covid in August 2020. He then reports in November she started to develop alopecia to the point where she now is having to wear a wig. There is no evidence of recurrent disease. She does have a history of bilateral axillary inclusion cysts; no inflammatory lesions noted on this exam.  09/21/21 No evidence of recurrent disease. Going forward, needs a good CBE once yearly and monthly BSE. 10/03/2022 Clinical follow up is without evidence of recurrent disease. We reviewed NCCN guidelines for breast cancer follow up. No routine imaging indicated.   CMP, CBC on 09/23/2022 reviewed and essentially normal.  She has new onset left lower abdominal discomfort in the setting of chronic constipation and without clinically concerning findings on exam.  Her last colonoscopy was reported to be 5-6 years ago and was negative for polyps or abnormal findings. She is following with GI, Dr. Marcelino Rivero and a CT of the abdomen is pending. Will plan to see in 1 year for clinical exam (alternate 6 months after gynecology follow up) and PRN. Plan:     Continue monthly breast/chest wall self examination; detailed instructions reviewed today. Bring any changes to your physician's attention. Continue healthy diet and exercise routinely as tolerated. Avoid alcohol or limit to < 3 drinks per week. Limit caffeine intake. No indication for Imaging. Continue follow up with Primary Care/Gynecology and all specialties as directed. She prefers close observation; return to see me in 12 months for clinical exam per her request.      I spent a total of 20 minutes on the date of the service which included preparing to see the patient, face-to-face patient care, completing clinical documentation, obtaining and/or reviewing separately obtained history, performing a medically appropriate examination, counseling and educating the patient/family/caregiver, ordering medications, tests, or procedures, communicating with other HCPs (not separately reported), independently interpreting results (not separately reported), communicating results to the patient/family/caregiver and care coordination (not separately reported). This document is generated, in part, by voice recognition software and thus syntax and grammatical errors are possible. Anthony Nava Peers) Zak Grimes, RN, MSN, APRN-CNP, [address] [place]  Advanced Oncology Certified Nurse Practitioner  Department of Breast Surgery  [facility]  [facility] in collaboration with Dr. Janeen Villanueva/Dr. Lor Coughlin [facility]/Dr. Maxime Downs APRN-CNP

## 2022-09-23 LAB
ALBUMIN SERPL-MCNC: 4.3 G/DL (ref 3.5–5.2)
ALP BLD-CCNC: 54 U/L (ref 35–104)
ALT SERPL-CCNC: 16 U/L (ref 0–32)
AMYLASE: 52 U/L (ref 20–100)
ANION GAP SERPL CALCULATED.3IONS-SCNC: 11 MMOL/L (ref 7–16)
AST SERPL-CCNC: 16 U/L (ref 0–31)
BASOPHILS ABSOLUTE: 0.08 E9/L (ref 0–0.2)
BASOPHILS RELATIVE PERCENT: 1 % (ref 0–2)
BILIRUB SERPL-MCNC: <0.2 MG/DL (ref 0–1.2)
BUN BLDV-MCNC: 11 MG/DL (ref 6–20)
CALCIUM SERPL-MCNC: 9 MG/DL (ref 8.6–10.2)
CHLORIDE BLD-SCNC: 104 MMOL/L (ref 98–107)
CO2: 21 MMOL/L (ref 22–29)
CREAT SERPL-MCNC: 0.8 MG/DL (ref 0.5–1)
EOSINOPHILS ABSOLUTE: 0.14 E9/L (ref 0.05–0.5)
EOSINOPHILS RELATIVE PERCENT: 1.8 % (ref 0–6)
GFR AFRICAN AMERICAN: >60
GFR NON-AFRICAN AMERICAN: >60 ML/MIN/1.73
GLUCOSE BLD-MCNC: 86 MG/DL (ref 74–99)
HCT VFR BLD CALC: 47 % (ref 34–48)
HEMOGLOBIN: 15.3 G/DL (ref 11.5–15.5)
IMMATURE GRANULOCYTES #: 0.02 E9/L
IMMATURE GRANULOCYTES %: 0.3 % (ref 0–5)
LIPASE: 25 U/L (ref 13–60)
LYMPHOCYTES ABSOLUTE: 2.33 E9/L (ref 1.5–4)
LYMPHOCYTES RELATIVE PERCENT: 29.6 % (ref 20–42)
MCH RBC QN AUTO: 30.2 PG (ref 26–35)
MCHC RBC AUTO-ENTMCNC: 32.6 % (ref 32–34.5)
MCV RBC AUTO: 92.9 FL (ref 80–99.9)
MONOCYTES ABSOLUTE: 0.69 E9/L (ref 0.1–0.95)
MONOCYTES RELATIVE PERCENT: 8.8 % (ref 2–12)
NEUTROPHILS ABSOLUTE: 4.6 E9/L (ref 1.8–7.3)
NEUTROPHILS RELATIVE PERCENT: 58.5 % (ref 43–80)
PDW BLD-RTO: 13.7 FL (ref 11.5–15)
PLATELET # BLD: 246 E9/L (ref 130–450)
PMV BLD AUTO: 11 FL (ref 7–12)
POTASSIUM SERPL-SCNC: 4.5 MMOL/L (ref 3.5–5)
RBC # BLD: 5.06 E12/L (ref 3.5–5.5)
SODIUM BLD-SCNC: 136 MMOL/L (ref 132–146)
TOTAL PROTEIN: 7.1 G/DL (ref 6.4–8.3)
WBC # BLD: 7.9 E9/L (ref 4.5–11.5)

## 2022-10-03 ENCOUNTER — OFFICE VISIT (OUTPATIENT)
Dept: BREAST CENTER | Age: 47
End: 2022-10-03
Payer: COMMERCIAL

## 2022-10-03 VITALS
OXYGEN SATURATION: 97 % | HEIGHT: 65 IN | BODY MASS INDEX: 40.32 KG/M2 | TEMPERATURE: 97.5 F | SYSTOLIC BLOOD PRESSURE: 132 MMHG | RESPIRATION RATE: 20 BRPM | DIASTOLIC BLOOD PRESSURE: 78 MMHG | WEIGHT: 242 LBS | HEART RATE: 78 BPM

## 2022-10-03 DIAGNOSIS — R10.32 LEFT LOWER QUADRANT ABDOMINAL PAIN: ICD-10-CM

## 2022-10-03 PROCEDURE — 99213 OFFICE O/P EST LOW 20 MIN: CPT | Performed by: NURSE PRACTITIONER

## 2022-10-03 RX ORDER — PLECANATIDE 3 MG/1
TABLET ORAL
COMMUNITY
Start: 2022-09-16

## 2022-10-03 ASSESSMENT — ENCOUNTER SYMPTOMS
RECTAL PAIN: 0
BLOOD IN STOOL: 0
CONSTIPATION: 1
ABDOMINAL DISTENTION: 0
ABDOMINAL PAIN: 1
NAUSEA: 0
DIARRHEA: 0

## 2023-01-27 ENCOUNTER — HOSPITAL ENCOUNTER (OUTPATIENT)
Age: 48
Discharge: HOME | End: 2023-01-29

## 2023-01-27 PROCEDURE — 88304 TISSUE EXAM BY PATHOLOGIST: CPT

## 2023-03-06 DIAGNOSIS — Z00.00 ENCOUNTER FOR WELL ADULT EXAM WITHOUT ABNORMAL FINDINGS: Primary | ICD-10-CM

## 2023-03-06 DIAGNOSIS — E03.9 ACQUIRED HYPOTHYROIDISM: ICD-10-CM

## 2023-03-07 DIAGNOSIS — Z00.00 ENCOUNTER FOR WELL ADULT EXAM WITHOUT ABNORMAL FINDINGS: ICD-10-CM

## 2023-03-07 DIAGNOSIS — E03.9 ACQUIRED HYPOTHYROIDISM: ICD-10-CM

## 2023-03-07 LAB
ALBUMIN SERPL-MCNC: 4.2 G/DL (ref 3.5–5.2)
ALP BLD-CCNC: 75 U/L (ref 35–104)
ALT SERPL-CCNC: 13 U/L (ref 0–32)
ANION GAP SERPL CALCULATED.3IONS-SCNC: 15 MMOL/L (ref 7–16)
AST SERPL-CCNC: 14 U/L (ref 0–31)
BASOPHILS ABSOLUTE: 0.08 E9/L (ref 0–0.2)
BASOPHILS RELATIVE PERCENT: 0.9 % (ref 0–2)
BILIRUB SERPL-MCNC: <0.2 MG/DL (ref 0–1.2)
BUN BLDV-MCNC: 13 MG/DL (ref 6–20)
CALCIUM SERPL-MCNC: 8.9 MG/DL (ref 8.6–10.2)
CHLORIDE BLD-SCNC: 105 MMOL/L (ref 98–107)
CHOLESTEROL, TOTAL: 153 MG/DL (ref 0–199)
CO2: 20 MMOL/L (ref 22–29)
CREAT SERPL-MCNC: 0.8 MG/DL (ref 0.5–1)
CREATININE URINE: 327 MG/DL (ref 29–226)
EOSINOPHILS ABSOLUTE: 0.18 E9/L (ref 0.05–0.5)
EOSINOPHILS RELATIVE PERCENT: 2 % (ref 0–6)
GFR SERPL CREATININE-BSD FRML MDRD: >60 ML/MIN/1.73
GLUCOSE BLD-MCNC: 106 MG/DL (ref 74–99)
HBA1C MFR BLD: 5.3 % (ref 4–5.6)
HCT VFR BLD CALC: 47.6 % (ref 34–48)
HDLC SERPL-MCNC: 47 MG/DL
HEMOGLOBIN: 15.5 G/DL (ref 11.5–15.5)
IMMATURE GRANULOCYTES #: 0.02 E9/L
IMMATURE GRANULOCYTES %: 0.2 % (ref 0–5)
LDL CHOLESTEROL CALCULATED: 82 MG/DL (ref 0–99)
LYMPHOCYTES ABSOLUTE: 2.16 E9/L (ref 1.5–4)
LYMPHOCYTES RELATIVE PERCENT: 24.5 % (ref 20–42)
MCH RBC QN AUTO: 29.2 PG (ref 26–35)
MCHC RBC AUTO-ENTMCNC: 32.6 % (ref 32–34.5)
MCV RBC AUTO: 89.8 FL (ref 80–99.9)
MICROALBUMIN UR-MCNC: 14.9 MG/L
MICROALBUMIN/CREAT UR-RTO: 4.6 (ref 0–30)
MONOCYTES ABSOLUTE: 0.58 E9/L (ref 0.1–0.95)
MONOCYTES RELATIVE PERCENT: 6.6 % (ref 2–12)
NEUTROPHILS ABSOLUTE: 5.81 E9/L (ref 1.8–7.3)
NEUTROPHILS RELATIVE PERCENT: 65.8 % (ref 43–80)
PDW BLD-RTO: 13 FL (ref 11.5–15)
PLATELET # BLD: 261 E9/L (ref 130–450)
PMV BLD AUTO: 10.8 FL (ref 7–12)
POTASSIUM SERPL-SCNC: 4.3 MMOL/L (ref 3.5–5)
RBC # BLD: 5.3 E12/L (ref 3.5–5.5)
SODIUM BLD-SCNC: 140 MMOL/L (ref 132–146)
T4 FREE: 1.27 NG/DL (ref 0.93–1.7)
TOTAL PROTEIN: 7 G/DL (ref 6.4–8.3)
TRIGL SERPL-MCNC: 120 MG/DL (ref 0–149)
TSH SERPL DL<=0.05 MIU/L-ACNC: 0.88 UIU/ML (ref 0.27–4.2)
VLDLC SERPL CALC-MCNC: 24 MG/DL
WBC # BLD: 8.8 E9/L (ref 4.5–11.5)

## 2023-03-15 ENCOUNTER — OFFICE VISIT (OUTPATIENT)
Dept: FAMILY MEDICINE CLINIC | Age: 48
End: 2023-03-15
Payer: COMMERCIAL

## 2023-03-15 VITALS
HEART RATE: 74 BPM | SYSTOLIC BLOOD PRESSURE: 120 MMHG | BODY MASS INDEX: 39.34 KG/M2 | DIASTOLIC BLOOD PRESSURE: 78 MMHG | WEIGHT: 230.4 LBS | TEMPERATURE: 97.9 F | OXYGEN SATURATION: 98 % | HEIGHT: 64 IN

## 2023-03-15 DIAGNOSIS — F41.9 ANXIETY: ICD-10-CM

## 2023-03-15 DIAGNOSIS — K58.1 IRRITABLE BOWEL SYNDROME WITH CONSTIPATION: ICD-10-CM

## 2023-03-15 DIAGNOSIS — Z00.01 ENCOUNTER FOR WELL ADULT EXAM WITH ABNORMAL FINDINGS: Primary | ICD-10-CM

## 2023-03-15 DIAGNOSIS — L71.9 ROSACEA: ICD-10-CM

## 2023-03-15 PROCEDURE — 99396 PREV VISIT EST AGE 40-64: CPT | Performed by: FAMILY MEDICINE

## 2023-03-15 RX ORDER — LINACLOTIDE 290 UG/1
CAPSULE, GELATIN COATED ORAL PRN
COMMUNITY
Start: 2022-12-22

## 2023-03-15 RX ORDER — ESCITALOPRAM OXALATE 5 MG/1
5 TABLET ORAL DAILY
Qty: 90 TABLET | Refills: 1 | Status: SHIPPED | OUTPATIENT
Start: 2023-03-15

## 2023-03-15 ASSESSMENT — ENCOUNTER SYMPTOMS
WHEEZING: 0
SHORTNESS OF BREATH: 0
CHEST TIGHTNESS: 0
CONSTIPATION: 0
TROUBLE SWALLOWING: 0
VOMITING: 0
NAUSEA: 0
BACK PAIN: 0
EYE PAIN: 0
SINUS PAIN: 0
COUGH: 0
ABDOMINAL PAIN: 0
DIARRHEA: 0
SORE THROAT: 0

## 2023-03-15 ASSESSMENT — PATIENT HEALTH QUESTIONNAIRE - PHQ9
2. FEELING DOWN, DEPRESSED OR HOPELESS: 0
SUM OF ALL RESPONSES TO PHQ QUESTIONS 1-9: 0
SUM OF ALL RESPONSES TO PHQ QUESTIONS 1-9: 0
1. LITTLE INTEREST OR PLEASURE IN DOING THINGS: 0
SUM OF ALL RESPONSES TO PHQ QUESTIONS 1-9: 0
SUM OF ALL RESPONSES TO PHQ QUESTIONS 1-9: 0
SUM OF ALL RESPONSES TO PHQ9 QUESTIONS 1 & 2: 0
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
## 2023-07-19 SDOH — ECONOMIC STABILITY: TRANSPORTATION INSECURITY
IN THE PAST 12 MONTHS, HAS LACK OF TRANSPORTATION KEPT YOU FROM MEETINGS, WORK, OR FROM GETTING THINGS NEEDED FOR DAILY LIVING?: NO

## 2023-07-19 SDOH — ECONOMIC STABILITY: HOUSING INSECURITY
IN THE LAST 12 MONTHS, WAS THERE A TIME WHEN YOU DID NOT HAVE A STEADY PLACE TO SLEEP OR SLEPT IN A SHELTER (INCLUDING NOW)?: NO

## 2023-07-19 SDOH — ECONOMIC STABILITY: FOOD INSECURITY: WITHIN THE PAST 12 MONTHS, YOU WORRIED THAT YOUR FOOD WOULD RUN OUT BEFORE YOU GOT MONEY TO BUY MORE.: NEVER TRUE

## 2023-07-19 SDOH — ECONOMIC STABILITY: INCOME INSECURITY: HOW HARD IS IT FOR YOU TO PAY FOR THE VERY BASICS LIKE FOOD, HOUSING, MEDICAL CARE, AND HEATING?: NOT VERY HARD

## 2023-07-19 SDOH — ECONOMIC STABILITY: FOOD INSECURITY: WITHIN THE PAST 12 MONTHS, THE FOOD YOU BOUGHT JUST DIDN'T LAST AND YOU DIDN'T HAVE MONEY TO GET MORE.: NEVER TRUE

## 2023-07-20 ENCOUNTER — OFFICE VISIT (OUTPATIENT)
Dept: FAMILY MEDICINE CLINIC | Age: 48
End: 2023-07-20
Payer: COMMERCIAL

## 2023-07-20 VITALS
TEMPERATURE: 98 F | HEIGHT: 64 IN | DIASTOLIC BLOOD PRESSURE: 62 MMHG | SYSTOLIC BLOOD PRESSURE: 118 MMHG | BODY MASS INDEX: 38.41 KG/M2 | OXYGEN SATURATION: 98 % | HEART RATE: 74 BPM | WEIGHT: 225 LBS

## 2023-07-20 DIAGNOSIS — R22.41 LUMP OF RIGHT THIGH: Primary | ICD-10-CM

## 2023-07-20 DIAGNOSIS — F41.9 ANXIETY: ICD-10-CM

## 2023-07-20 PROCEDURE — 99213 OFFICE O/P EST LOW 20 MIN: CPT | Performed by: FAMILY MEDICINE

## 2023-07-20 RX ORDER — LEVOTHYROXINE SODIUM 137 UG/1
TABLET ORAL
COMMUNITY

## 2023-07-20 RX ORDER — TENAPANOR HYDROCHLORIDE 53.2 MG/1
1 TABLET ORAL 2 TIMES DAILY
COMMUNITY
Start: 2023-05-15

## 2023-07-20 RX ORDER — ESCITALOPRAM OXALATE 10 MG/1
10 TABLET ORAL DAILY
Qty: 90 TABLET | Refills: 1 | Status: SHIPPED | OUTPATIENT
Start: 2023-07-20

## 2023-07-20 ASSESSMENT — ENCOUNTER SYMPTOMS
SORE THROAT: 0
DIARRHEA: 0
CHEST TIGHTNESS: 0
VOMITING: 0
NAUSEA: 0
CONSTIPATION: 0
WHEEZING: 0
SINUS PAIN: 0
COUGH: 0
SHORTNESS OF BREATH: 0
TROUBLE SWALLOWING: 0
ABDOMINAL PAIN: 0
EYE PAIN: 0
BACK PAIN: 0

## 2023-07-20 NOTE — PROGRESS NOTES
23    Name: Caitlin Irwin  :1975   Sex:female   Age:47 y.o. Chief Complaint   Patient presents with    Mass     Patient has had a lump under the skin for the past 6 to 8 months. She says the lump has gotten slightly larger and is tender to the touch now. Patient says the skin around it is not red. Bump/nodule right thigh area  She noticed it a few months ago  Possibly mmore prominent now b/c she is losing weight  Tender some days and other day not noticeable at all, does not stick out  Not red, no wound or bite there  Sterling snot notice anything on the other leg    Also increased lexapro to 10mg daily and it is working great for her at that dose  No side effects or issues, finds it helpful  Will continue then at that dose    Also seeing a retinal specialist in [place]  She has a benign melanoma behind the one eye that they are following  She goes back in September for follow up measurements        Review of Systems   Constitutional:  Negative for appetite change, fatigue and fever. HENT:  Negative for congestion, ear pain, sinus pain, sore throat and trouble swallowing. Eyes:  Negative for pain. Respiratory:  Negative for cough, chest tightness, shortness of breath and wheezing. Cardiovascular:  Negative for chest pain, palpitations and leg swelling. Gastrointestinal:  Negative for abdominal pain, constipation, diarrhea, nausea and vomiting. Endocrine: Negative for cold intolerance and heat intolerance. Genitourinary:  Negative for difficulty urinating, hematuria and pelvic pain. Musculoskeletal:  Negative for back pain, gait problem and joint swelling. Skin:  Negative for rash and wound. Neurological:  Negative for dizziness, syncope and headaches. Hematological:  Negative for adenopathy. Psychiatric/Behavioral:  Negative for confusion, sleep disturbance and suicidal ideas.           Current Outpatient Medications:     escitalopram (LEXAPRO) 10 MG tablet, Take 1 tablet by

## 2023-07-27 DIAGNOSIS — D17.23 LIPOMA OF RIGHT LOWER EXTREMITY: Primary | ICD-10-CM

## 2023-10-06 ASSESSMENT — ENCOUNTER SYMPTOMS
WHEEZING: 0
DIARRHEA: 0
BACK PAIN: 0
SHORTNESS OF BREATH: 0
RECTAL PAIN: 0
BLOOD IN STOOL: 0
NAUSEA: 0
ABDOMINAL DISTENTION: 0
TROUBLE SWALLOWING: 0
VOICE CHANGE: 0
COUGH: 0

## 2023-10-06 NOTE — PROGRESS NOTES
Summary: History of Left breast DCIS s/p Bilateral skin sparing mastectomy with autologous reconstruction and left SLN biopsy at [facility] 2014 per Dr. Genet Lockhart/Dr. Marilee Hogue. This note was copied forward from the last encounter. Essential components for this patient record were reviewed and verified on this visit including:  recent hospitalizations, recent imaging, PMH, PSH, FH, SOC HX, Allergies, and Medications were reviewed and updated as appropriate. In addition, the assessment and plan were copied from prior office note and updated accordingly. Patient ID: Maria Esther Meng is a 41 y.o. female. HPI   HIGH BROOMS is a gerson 41 y.o. female with a history of pre-menopausal (age 41) stage 0 (Tis, N0, M0) extensive left breast ductal carcinoma in situ: ER/MI positive with H-score 190; MI positive H-score 95. Menarche age 8. . First live birth age 34. She did breast-feed. Breast cancer risk factors include paternal great aunt with breast cancer at age 48; 1 (of this aunts daughters) paternal great cousins with breast cancer in their 46s. Hx skin cancer in the family, unknown which sub-type. Ashkenazi Methodist Ancestry: No.     Historically, she underwent a baseline mammogram in May 2014 which revealed calcifications in the upper outer quadrant of the left breast over a 6 x 1.5 cm area which were deemed suspicious. A left breast ultrasound noted calcifications without mass. 2014 Stereotactic core needle biopsy of calcifications in the retroareolar breast as well as in the posterior 1:00 breast revealed ductal carcinoma in situ, solid and cribriform type, nuclear grade 2-3 with comedo necrosis and focal apocrine features. No evidence of invasive carcinoma. -6/10/2014:  Diagnostic mammogram at [facility] noted calcifications over a 9.7 cm region extending to within 3 mm of the nipple into the upper outer quadrant.      2014 Bilateral Breast MRI @ [facility]

## 2023-10-09 ENCOUNTER — OFFICE VISIT (OUTPATIENT)
Dept: BREAST CENTER | Age: 48
End: 2023-10-09
Payer: COMMERCIAL

## 2023-10-09 VITALS
OXYGEN SATURATION: 98 % | TEMPERATURE: 97.8 F | WEIGHT: 230 LBS | BODY MASS INDEX: 38.32 KG/M2 | DIASTOLIC BLOOD PRESSURE: 72 MMHG | SYSTOLIC BLOOD PRESSURE: 128 MMHG | HEART RATE: 83 BPM | RESPIRATION RATE: 20 BRPM | HEIGHT: 65 IN

## 2023-10-09 DIAGNOSIS — E89.0 POSTOPERATIVE HYPOTHYROIDISM: Primary | ICD-10-CM

## 2023-10-09 DIAGNOSIS — D05.12 DUCTAL CARCINOMA IN SITU (DCIS) OF LEFT BREAST: ICD-10-CM

## 2023-10-09 DIAGNOSIS — R59.0 LYMPHADENOPATHY, AXILLARY: ICD-10-CM

## 2023-10-09 PROBLEM — D31.31 BENIGN NEOPLASM OF CHOROID OF RIGHT EYE: Status: ACTIVE | Noted: 2023-03-08

## 2023-10-09 PROBLEM — R10.32 LEFT LOWER QUADRANT ABDOMINAL PAIN: Status: RESOLVED | Noted: 2022-10-03 | Resolved: 2023-10-09

## 2023-10-09 PROBLEM — F41.9 ANXIETY: Status: RESOLVED | Noted: 2023-07-20 | Resolved: 2023-10-09

## 2023-10-09 PROCEDURE — 99213 OFFICE O/P EST LOW 20 MIN: CPT | Performed by: NURSE PRACTITIONER

## 2023-10-09 RX ORDER — MINOCYCLINE HYDROCHLORIDE 100 MG/1
CAPSULE ORAL
COMMUNITY
Start: 2023-10-02

## 2023-10-09 RX ORDER — LEVONORGESTREL 52 MG/1
1 INTRAUTERINE DEVICE INTRAUTERINE ONCE
COMMUNITY

## 2023-10-09 ASSESSMENT — ENCOUNTER SYMPTOMS
CHOKING: 0
ANAL BLEEDING: 0
VOMITING: 0
CONSTIPATION: 0
CHEST TIGHTNESS: 0
RESPIRATORY NEGATIVE: 1
ABDOMINAL PAIN: 0

## 2023-10-20 ENCOUNTER — HOSPITAL ENCOUNTER (OUTPATIENT)
Dept: GENERAL RADIOLOGY | Age: 48
Discharge: HOME | End: 2023-10-20
Payer: COMMERCIAL

## 2023-10-20 VITALS — WEIGHT: 220 LBS | HEIGHT: 65 IN | BODY MASS INDEX: 36.65 KG/M2

## 2023-10-20 DIAGNOSIS — R59.0 LYMPHADENOPATHY, AXILLARY: ICD-10-CM

## 2023-10-20 DIAGNOSIS — D05.12 DUCTAL CARCINOMA IN SITU (DCIS) OF LEFT BREAST: ICD-10-CM

## 2023-10-20 PROCEDURE — 76882 US LMTD JT/FCL EVL NVASC XTR: CPT

## 2023-12-26 ENCOUNTER — OFFICE VISIT (OUTPATIENT)
Dept: FAMILY MEDICINE CLINIC | Age: 48
End: 2023-12-26
Payer: COMMERCIAL

## 2023-12-26 VITALS
HEART RATE: 85 BPM | RESPIRATION RATE: 18 BRPM | TEMPERATURE: 98.7 F | OXYGEN SATURATION: 99 % | DIASTOLIC BLOOD PRESSURE: 80 MMHG | WEIGHT: 223 LBS | SYSTOLIC BLOOD PRESSURE: 128 MMHG | BODY MASS INDEX: 38.07 KG/M2 | HEIGHT: 64 IN

## 2023-12-26 DIAGNOSIS — R06.2 WHEEZING: Primary | ICD-10-CM

## 2023-12-26 PROCEDURE — 96372 THER/PROPH/DIAG INJ SC/IM: CPT | Performed by: FAMILY MEDICINE

## 2023-12-26 PROCEDURE — 99214 OFFICE O/P EST MOD 30 MIN: CPT | Performed by: FAMILY MEDICINE

## 2023-12-26 RX ORDER — ALBUTEROL SULFATE 90 UG/1
2 AEROSOL, METERED RESPIRATORY (INHALATION) 4 TIMES DAILY PRN
Qty: 18 G | Refills: 0 | Status: SHIPPED | OUTPATIENT
Start: 2023-12-26

## 2023-12-26 RX ORDER — PREDNISONE 10 MG/1
TABLET ORAL
Qty: 18 TABLET | Refills: 0 | Status: SHIPPED | OUTPATIENT
Start: 2023-12-26

## 2023-12-26 RX ORDER — TRIAMCINOLONE ACETONIDE 40 MG/ML
40 INJECTION, SUSPENSION INTRA-ARTICULAR; INTRAMUSCULAR ONCE
Status: COMPLETED | OUTPATIENT
Start: 2023-12-26 | End: 2023-12-26

## 2023-12-26 RX ADMIN — TRIAMCINOLONE ACETONIDE 40 MG: 40 INJECTION, SUSPENSION INTRA-ARTICULAR; INTRAMUSCULAR at 13:00

## 2024-10-10 NOTE — PROGRESS NOTES
Summary: History of Left breast DCIS s/p Bilateral skin sparing mastectomy with autologous reconstruction and left SLN biopsy at [facility] 2014 per Dr. Lynn Lockhart/Dr. Cipriano Quiñonez.   This note was copied forward from the last encounter.  Essential components for this patient record were reviewed and verified on this visit including:  recent hospitalizations, recent imaging, PMH, PSH, FH, SOC HX, Allergies, and Medications were reviewed and updated as appropriate.  In addition, the assessment and plan were copied from prior office note and updated accordingly.     Patient ID: Justyna Restrepo is a 48 y.o. female.   MIRYAM Jansen is a gerson 48 y.o. female with a history of pre-menopausal (age 38) stage 0 (Tis, N0, M0) extensive left breast ductal carcinoma in situ: ER/MO positive with H-score 190; MO positive H-score 95.  Menarche age 10.  .  First live birth age 33.  She did breast-feed. Breast cancer risk factors include paternal great aunt with breast cancer at age 50; 3 (of this aunts daughters) paternal great cousins with breast cancer in their 50s. Hx skin cancer in the family, unknown which sub-type.    [employer] Ancestry: No.     Historically, she underwent a baseline mammogram in May 2014 which revealed calcifications in the upper outer quadrant of the left breast over a 6 x 1.5 cm area which were deemed suspicious. A left breast ultrasound noted calcifications without mass.     2014 Stereotactic core needle biopsy of calcifications in the retroareolar breast as well as in the posterior 1:00 breast revealed ductal carcinoma in situ, solid and cribriform type, nuclear grade 2-3 with comedo necrosis and focal apocrine features. No evidence of invasive carcinoma.    -6/10/2014:  Diagnostic mammogram at [facility] noted calcifications over a 9.7 cm region extending to within 3 mm of the nipple into the upper outer quadrant.     2014 Bilateral Breast MRI @ [place]

## 2024-10-14 ENCOUNTER — OFFICE VISIT (OUTPATIENT)
Dept: BREAST CENTER | Age: 49
End: 2024-10-14
Payer: COMMERCIAL

## 2024-10-14 VITALS
OXYGEN SATURATION: 98 % | HEIGHT: 65 IN | RESPIRATION RATE: 20 BRPM | SYSTOLIC BLOOD PRESSURE: 130 MMHG | DIASTOLIC BLOOD PRESSURE: 68 MMHG | BODY MASS INDEX: 39.99 KG/M2 | TEMPERATURE: 97.8 F | HEART RATE: 73 BPM | WEIGHT: 240 LBS

## 2024-10-14 DIAGNOSIS — Z85.3 PERSONAL HISTORY OF BREAST CANCER: Primary | ICD-10-CM

## 2024-10-14 PROCEDURE — 99213 OFFICE O/P EST LOW 20 MIN: CPT | Performed by: NURSE PRACTITIONER

## 2024-10-14 RX ORDER — ERGOCALCIFEROL 1.25 MG/1
CAPSULE, LIQUID FILLED ORAL
COMMUNITY
Start: 2024-08-09

## 2024-10-14 RX ORDER — RIMEGEPANT SULFATE 75 MG/75MG
TABLET, ORALLY DISINTEGRATING ORAL
COMMUNITY
Start: 2024-09-23

## 2024-10-14 RX ORDER — CITALOPRAM HYDROBROMIDE 20 MG/1
20 TABLET ORAL DAILY
COMMUNITY
Start: 2024-09-23

## 2024-10-14 ASSESSMENT — ENCOUNTER SYMPTOMS: STRIDOR: 0
